# Patient Record
Sex: FEMALE | Race: BLACK OR AFRICAN AMERICAN | Employment: UNEMPLOYED | URBAN - METROPOLITAN AREA
[De-identification: names, ages, dates, MRNs, and addresses within clinical notes are randomized per-mention and may not be internally consistent; named-entity substitution may affect disease eponyms.]

---

## 2017-06-19 ENCOUNTER — HOSPITAL ENCOUNTER (OUTPATIENT)
Dept: MAMMOGRAPHY | Age: 52
Discharge: HOME | End: 2017-06-19
Attending: FAMILY MEDICINE | Admitting: FAMILY MEDICINE

## 2017-06-19 DIAGNOSIS — Z12.31 ENCOUNTER FOR SCREENING MAMMOGRAM FOR BREAST CANCER: ICD-10-CM

## 2018-08-24 ENCOUNTER — HOSPITAL ENCOUNTER (OUTPATIENT)
Dept: MAMMOGRAPHY | Age: 53
Discharge: HOME | End: 2018-08-24
Attending: FAMILY MEDICINE | Admitting: FAMILY MEDICINE

## 2018-08-24 DIAGNOSIS — Z12.31 VISIT FOR SCREENING MAMMOGRAM: ICD-10-CM

## 2019-03-23 ENCOUNTER — HOSPITAL ENCOUNTER (EMERGENCY)
Age: 54
Discharge: HOME | End: 2019-03-23

## 2019-03-23 VITALS
HEART RATE: 99 BPM | OXYGEN SATURATION: 100 % | HEIGHT: 63 IN | BODY MASS INDEX: 23.92 KG/M2 | DIASTOLIC BLOOD PRESSURE: 78 MMHG | RESPIRATION RATE: 16 BRPM | WEIGHT: 135 LBS | SYSTOLIC BLOOD PRESSURE: 123 MMHG | TEMPERATURE: 97.8 F

## 2019-03-23 DIAGNOSIS — R51.9 FRONTAL HEADACHE: Primary | ICD-10-CM

## 2019-03-23 DIAGNOSIS — J01.10 ACUTE FRONTAL SINUSITIS, RECURRENCE NOT SPECIFIED: ICD-10-CM

## 2019-03-23 PROCEDURE — 99282 EMERGENCY DEPT VISIT SF MDM: CPT

## 2019-03-23 RX ORDER — GUAIFENESIN/DEXTROMETHORPHAN 100-10MG/5
5 SYRUP ORAL 3 TIMES DAILY PRN
Qty: 120 ML | Refills: 0 | Status: SHIPPED | OUTPATIENT
Start: 2019-03-23 | End: 2019-03-23 | Stop reason: SDUPTHER

## 2019-03-23 RX ORDER — FLUTICASONE PROPIONATE 50 MCG
1 SPRAY, SUSPENSION (ML) NASAL DAILY
Qty: 1 BOTTLE | Refills: 0 | Status: SHIPPED | OUTPATIENT
Start: 2019-03-23

## 2019-03-23 RX ORDER — CETIRIZINE HYDROCHLORIDE 10 MG/1
10 TABLET ORAL DAILY
Qty: 30 TABLET | Refills: 0 | Status: SHIPPED | OUTPATIENT
Start: 2019-03-23 | End: 2019-04-22

## 2019-03-23 RX ORDER — GUAIFENESIN/DEXTROMETHORPHAN 100-10MG/5
5 SYRUP ORAL 3 TIMES DAILY PRN
Qty: 120 ML | Refills: 0 | Status: SHIPPED | OUTPATIENT
Start: 2019-03-23 | End: 2019-04-02

## 2019-03-23 ASSESSMENT — ENCOUNTER SYMPTOMS
DIARRHEA: 0
COLOR CHANGE: 0
RHINORRHEA: 1
EYE REDNESS: 0
EYE DISCHARGE: 0
COUGH: 0
VOMITING: 0
SINUS PAIN: 1
SORE THROAT: 0
RESPIRATORY NEGATIVE: 1
SINUS PRESSURE: 1
SHORTNESS OF BREATH: 0
FACIAL SWELLING: 0
NAUSEA: 0
ABDOMINAL PAIN: 0
VOICE CHANGE: 0
CONSTIPATION: 0
TROUBLE SWALLOWING: 0

## 2019-03-23 ASSESSMENT — PAIN DESCRIPTION - LOCATION: LOCATION: FACE

## 2019-03-23 ASSESSMENT — PAIN DESCRIPTION - PAIN TYPE: TYPE: ACUTE PAIN

## 2019-03-23 ASSESSMENT — PAIN SCALES - GENERAL: PAINLEVEL_OUTOF10: 6

## 2019-05-07 ENCOUNTER — HOSPITAL ENCOUNTER (EMERGENCY)
Age: 54
Discharge: HOME | End: 2019-05-07
Payer: COMMERCIAL

## 2019-05-07 VITALS
DIASTOLIC BLOOD PRESSURE: 81 MMHG | HEART RATE: 87 BPM | OXYGEN SATURATION: 100 % | RESPIRATION RATE: 16 BRPM | TEMPERATURE: 98.5 F | SYSTOLIC BLOOD PRESSURE: 123 MMHG

## 2019-05-07 DIAGNOSIS — J01.00 ACUTE MAXILLARY SINUSITIS, RECURRENCE NOT SPECIFIED: Primary | ICD-10-CM

## 2019-05-07 PROCEDURE — 99282 EMERGENCY DEPT VISIT SF MDM: CPT

## 2019-05-07 RX ORDER — LORATADINE 10 MG/1
10 TABLET ORAL DAILY
Qty: 30 TABLET | Refills: 0 | Status: SHIPPED | OUTPATIENT
Start: 2019-05-07 | End: 2019-07-06

## 2019-05-07 RX ORDER — AMOXICILLIN AND CLAVULANATE POTASSIUM 875; 125 MG/1; MG/1
1 TABLET, FILM COATED ORAL 2 TIMES DAILY
Qty: 20 TABLET | Refills: 0 | Status: SHIPPED | OUTPATIENT
Start: 2019-05-07 | End: 2019-05-17

## 2019-05-07 ASSESSMENT — PAIN DESCRIPTION - PAIN TYPE: TYPE: ACUTE PAIN

## 2019-05-07 ASSESSMENT — ENCOUNTER SYMPTOMS
CONSTIPATION: 0
NAUSEA: 0
RHINORRHEA: 0
BLOOD IN STOOL: 0
SINUS PRESSURE: 1
VOMITING: 0
ABDOMINAL PAIN: 0
SHORTNESS OF BREATH: 0
SINUS PAIN: 1
SORE THROAT: 0
DIARRHEA: 0

## 2019-05-07 ASSESSMENT — PAIN DESCRIPTION - LOCATION: LOCATION: FACE

## 2019-05-07 ASSESSMENT — PAIN SCALES - GENERAL: PAINLEVEL_OUTOF10: 8

## 2019-05-07 NOTE — ED PROVIDER NOTES
[address] [facility]        Pt Name: Olman Soto  MRN: [id number]  [place] 1965  Date of evaluation: 5/7/2019  Provider: JANINA Cronin - CNP  PCP: Randi Tavarez MD      [address] [place] [address]       Chief Complaint   Patient presents with    Facial Pain     started 2 days ago, tried to tell PCP, and not getting better been taking OTC       HISTORY OF PRESENT ILLNESS   (Location/Symptom, Timing/Onset,Context/Setting, Quality, Duration, Modifying Factors, Severity)  Note limiting factors. Olman Soto is a 49 y.o. female who presents to ER with concern for sinus tenderness and postnasal drip. Present ×3 days. No improvement with Mucinex. She does report history of seasonal allergies but has not started seasonal allergy medication at this time. She denies fever, rash, headaches, dizziness, chest pain, shortness of breath, cough, congestion, abdominal pain, nausea, vomiting, diarrhea, constipation, blood in the stool, or painful urination. No family at bedside. Nursing Notes triage note reviewed and agreed with or any disagreements were addressed  in the HPI. REVIEW OF SYSTEMS    (2-9 systems for level 4, 10 or more for level 5)     Review of Systems   Constitutional: Negative for chills and fever. HENT: Positive for postnasal drip, sinus pressure and sinus pain. Negative for rhinorrhea and sore throat. Eyes: Negative for visual disturbance. Respiratory: Negative for shortness of breath. Cardiovascular: Negative for chest pain. Gastrointestinal: Negative for abdominal pain, blood in stool, constipation, diarrhea, nausea and vomiting. Genitourinary: Negative for dysuria, flank pain and hematuria. Skin: Negative for rash. Neurological: Negative for weakness and headaches. All other systems reviewed and are negative. Positives and Pertinent negatives as per HPI.   Except as noted above in the ROS, week: None     Minutes per session: None    Stress: None   Relationships    Social connections:     Talks on phone: None     Gets together: None     Attends Zoroastrian service: None     Active member of club or organization: None     Attends meetings of clubs or organizations: None     Relationship status: None    Intimate partner violence:     Fear of current or ex partner: None     Emotionally abused: None     Physically abused: None     Forced sexual activity: None   Other Topics Concern    None   Social History Narrative    None       SCREENINGS             PHYSICAL EXAM  (up to 7 for level 4, 8 or more for level 5)     ED Triage Vitals [05/07/19 1017]   BP Temp Temp src Pulse Resp SpO2 Height Weight   123/81 98.5 °F (36.9 °C) -- 87 16 100 % -- --       Physical Exam   Constitutional: She is oriented to person, place, and time. She appears well-developed and well-nourished. No distress. HENT:   Head: Normocephalic and atraumatic. Nose: Right sinus exhibits maxillary sinus tenderness. Right sinus exhibits no frontal sinus tenderness. Left sinus exhibits maxillary sinus tenderness. Left sinus exhibits no frontal sinus tenderness. Eyes: Right eye exhibits no discharge. Left eye exhibits no discharge. Neck: Normal range of motion. Pulmonary/Chest: Effort normal. No stridor. No respiratory distress. Musculoskeletal: Normal range of motion. Neurological: She is alert and oriented to person, place, and time. Skin: Skin is warm and dry. She is not diaphoretic. No pallor. Psychiatric: She has a normal mood and affect. Her behavior is normal.   Nursing note and vitals reviewed. DIAGNOSTIC RESULTS   LABS:    Labs Reviewed - No data to display    All other labs werewithin normal range or not returned as of this dictation. EKG:  All EKG's are interpreted by the Emergency Department Physician who either signs or Co-signs this chart in the absence of acardiologist.  Please see their note for interpretation of EKG. RADIOLOGY:   Interpretation per the Radiologist below, if available at the time of this note:    No orders to display     No results found. PROCEDURES   Unless otherwise noted below, none     Procedures    CRITICAL CARE TIME     n/a    CONSULTS:  None      EMERGENCY DEPARTMENT COURSE and DIFFERENTIAL DIAGNOSIS/MDM:   Vitals:    Vitals:    05/07/19 1017   BP: 123/81   Pulse: 87   Resp: 16   Temp: 98.5 °F (36.9 °C)   SpO2: 100%       Ethan Mooney was given the following medications:  Medications - No data to display    Ethan Mooney was evaluated in the emergency department with concern for sinus pressure and postnasal drip. Consistent with sinus infection. The presentation is consistent with URI. This is likely the result of viral or allergic causes. There is no evidence of sepsis, meningitis, epiglottitis, pneumonia, streptococcal pharyngitis, otitis media, or other bacterial infection that would be managed with antibiotics. The patient is tolerating PO without difficulty and is in no acute distress on exam.  BS clear to ascultation. Vitals unremarkable. The patient can be managed as an outpatient. Strict return precautions given for changing or worsening pain, trouble swallowing or breathing, neck stiffness, fever, or rash. Ethan Mooney is stable in the ER and safe to follow as an outpatient. The patient is discharged on the following medications. They were counseled on how to take the newly prescribed medications:  Discharge Medication List as of 5/7/2019 11:09 AM      START taking these medications    Details   loratadine (CLARITIN) 10 MG tablet Take 1 tablet by mouth daily, Disp-30 tablet, R-0Print      amoxicillin-clavulanate (AUGMENTIN) 875-125 MG per tablet Take 1 tablet by mouth 2 times daily for 10 days, Disp-20 tablet, R-0Print          .   Instructed to follow-up with:  Kyle Kowalski MD  [facility] [id number]  [phone]    Schedule an appointment as soon as possible for a visit in 3 days  For a recheck    Return to the ER for new or worsening symptoms. This plan was discussed with the patient and all family available in the room at discharge who are all in agreement with the plan. I evaluated the patient. The physician was available for consultation as needed. The patient and / or the family were informed of the results of any tests, a time was given to answer questions, a plan was proposed and they agreed with plan. FINAL IMPRESSION      1.  Acute maxillary sinusitis, recurrence not specified          DISPOSITION/PLAN   DISPOSITION Decision To Discharge 05/07/2019 11:05:26 AM        DISCONTINUED MEDICATIONS:  Discharge Medication List as of 5/7/2019 11:09 AM                   (Please note that portions of this note were completed with a voice recognition program.  Efforts were made to edit the dictations but occasionally words are mis-transcribed.)    JANINA Lezama CNP (electronically signed)        JANINA Lezama CNP  05/07/19 9479

## 2019-06-25 ENCOUNTER — APPOINTMENT (OUTPATIENT)
Dept: CT IMAGING | Age: 54
End: 2019-06-25
Payer: COMMERCIAL

## 2019-06-25 ENCOUNTER — HOSPITAL ENCOUNTER (EMERGENCY)
Age: 54
Discharge: HOME | End: 2019-06-25
Attending: EMERGENCY MEDICINE
Payer: COMMERCIAL

## 2019-06-25 ENCOUNTER — APPOINTMENT (OUTPATIENT)
Dept: GENERAL RADIOLOGY | Age: 54
End: 2019-06-25
Payer: COMMERCIAL

## 2019-06-25 VITALS
SYSTOLIC BLOOD PRESSURE: 131 MMHG | BODY MASS INDEX: 23.92 KG/M2 | RESPIRATION RATE: 19 BRPM | TEMPERATURE: 98.9 F | WEIGHT: 130 LBS | OXYGEN SATURATION: 100 % | HEIGHT: 62 IN | HEART RATE: 87 BPM | DIASTOLIC BLOOD PRESSURE: 77 MMHG

## 2019-06-25 DIAGNOSIS — J34.89 SINUS PRESSURE: Primary | ICD-10-CM

## 2019-06-25 DIAGNOSIS — R42 DIZZINESS: ICD-10-CM

## 2019-06-25 LAB
A/G RATIO: 1.3 (ref 1.1–2.2)
ALBUMIN SERPL-MCNC: 4.3 G/DL (ref 3.4–5)
ALP BLD-CCNC: 83 U/L (ref 40–129)
ALT SERPL-CCNC: 16 U/L (ref 10–40)
ANION GAP SERPL CALCULATED.3IONS-SCNC: 12 MMOL/L (ref 3–16)
AST SERPL-CCNC: 46 U/L (ref 15–37)
BASOPHILS ABSOLUTE: 0 K/UL (ref 0–0.2)
BASOPHILS RELATIVE PERCENT: 0.8 %
BILIRUB SERPL-MCNC: <0.2 MG/DL (ref 0–1)
BUN BLDV-MCNC: 5 MG/DL (ref 7–20)
CALCIUM SERPL-MCNC: 9.5 MG/DL (ref 8.3–10.6)
CHLORIDE BLD-SCNC: 95 MMOL/L (ref 99–110)
CO2: 23 MMOL/L (ref 21–32)
CREAT SERPL-MCNC: 0.6 MG/DL (ref 0.6–1.1)
EOSINOPHILS ABSOLUTE: 0 K/UL (ref 0–0.6)
EOSINOPHILS RELATIVE PERCENT: 0.5 %
GFR AFRICAN AMERICAN: >60
GFR NON-AFRICAN AMERICAN: >60
GLOBULIN: 3.2 G/DL
GLUCOSE BLD-MCNC: 138 MG/DL (ref 70–99)
HCT VFR BLD CALC: 38.4 % (ref 36–48)
HEMOGLOBIN: 12.8 G/DL (ref 12–16)
LYMPHOCYTES ABSOLUTE: 1.6 K/UL (ref 1–5.1)
LYMPHOCYTES RELATIVE PERCENT: 39.1 %
MCH RBC QN AUTO: 32 PG (ref 26–34)
MCHC RBC AUTO-ENTMCNC: 33.2 G/DL (ref 31–36)
MCV RBC AUTO: 96.3 FL (ref 80–100)
MONOCYTES ABSOLUTE: 0.3 K/UL (ref 0–1.3)
MONOCYTES RELATIVE PERCENT: 8.1 %
NEUTROPHILS ABSOLUTE: 2.1 K/UL (ref 1.7–7.7)
NEUTROPHILS RELATIVE PERCENT: 51.5 %
PDW BLD-RTO: 12.6 % (ref 12.4–15.4)
PLATELET # BLD: 334 K/UL (ref 135–450)
PMV BLD AUTO: 6.5 FL (ref 5–10.5)
POTASSIUM SERPL-SCNC: 4.6 MMOL/L (ref 3.5–5.1)
RBC # BLD: 3.99 M/UL (ref 4–5.2)
SODIUM BLD-SCNC: 130 MMOL/L (ref 136–145)
TOTAL PROTEIN: 7.5 G/DL (ref 6.4–8.2)
TROPONIN: <0.01 NG/ML
WBC # BLD: 4.2 K/UL (ref 4–11)

## 2019-06-25 PROCEDURE — 70450 CT HEAD/BRAIN W/O DYE: CPT

## 2019-06-25 PROCEDURE — 96361 HYDRATE IV INFUSION ADD-ON: CPT

## 2019-06-25 PROCEDURE — 99284 EMERGENCY DEPT VISIT MOD MDM: CPT

## 2019-06-25 PROCEDURE — 96360 HYDRATION IV INFUSION INIT: CPT

## 2019-06-25 PROCEDURE — 71046 X-RAY EXAM CHEST 2 VIEWS: CPT

## 2019-06-25 PROCEDURE — 80053 COMPREHEN METABOLIC PANEL: CPT

## 2019-06-25 PROCEDURE — 2580000003 HC RX 258: Performed by: PHYSICIAN ASSISTANT

## 2019-06-25 PROCEDURE — 85025 COMPLETE CBC W/AUTO DIFF WBC: CPT

## 2019-06-25 PROCEDURE — 6370000000 HC RX 637 (ALT 250 FOR IP): Performed by: PHYSICIAN ASSISTANT

## 2019-06-25 PROCEDURE — 93005 ELECTROCARDIOGRAM TRACING: CPT | Performed by: PHYSICIAN ASSISTANT

## 2019-06-25 PROCEDURE — 84484 ASSAY OF TROPONIN QUANT: CPT

## 2019-06-25 RX ORDER — MECLIZINE HCL 12.5 MG/1
25 TABLET ORAL ONCE
Status: COMPLETED | OUTPATIENT
Start: 2019-06-25 | End: 2019-06-25

## 2019-06-25 RX ORDER — 0.9 % SODIUM CHLORIDE 0.9 %
1000 INTRAVENOUS SOLUTION INTRAVENOUS ONCE
Status: COMPLETED | OUTPATIENT
Start: 2019-06-25 | End: 2019-06-25

## 2019-06-25 RX ORDER — PSEUDOEPHEDRINE HCL 120 MG/1
120 TABLET, FILM COATED, EXTENDED RELEASE ORAL EVERY 12 HOURS
Qty: 14 TABLET | Refills: 0 | Status: SHIPPED | OUTPATIENT
Start: 2019-06-25 | End: 2019-07-02

## 2019-06-25 RX ORDER — MECLIZINE HYDROCHLORIDE 25 MG/1
25 TABLET ORAL 3 TIMES DAILY PRN
Qty: 20 TABLET | Refills: 0 | Status: SHIPPED | OUTPATIENT
Start: 2019-06-25 | End: 2019-07-05

## 2019-06-25 RX ADMIN — MECLIZINE 25 MG: 12.5 TABLET ORAL at 18:39

## 2019-06-25 RX ADMIN — SODIUM CHLORIDE 1000 ML: 9 INJECTION, SOLUTION INTRAVENOUS at 18:39

## 2019-06-25 ASSESSMENT — ENCOUNTER SYMPTOMS
TROUBLE SWALLOWING: 0
VOICE CHANGE: 0
RHINORRHEA: 0
WHEEZING: 0
SHORTNESS OF BREATH: 0
SINUS PRESSURE: 1
ABDOMINAL PAIN: 0
COUGH: 0
SORE THROAT: 0
VOMITING: 0
DIARRHEA: 0
NAUSEA: 0

## 2019-06-25 ASSESSMENT — PAIN DESCRIPTION - LOCATION: LOCATION: HEAD

## 2019-06-25 ASSESSMENT — PAIN SCALES - GENERAL: PAINLEVEL_OUTOF10: 7

## 2019-06-25 NOTE — ED PROVIDER NOTES
visual disturbance. Respiratory: Negative for cough, shortness of breath and wheezing. Cardiovascular: Negative for chest pain. Gastrointestinal: Negative for abdominal pain, diarrhea, nausea and vomiting. Genitourinary: Negative for difficulty urinating, dysuria and hematuria. Musculoskeletal: Negative for neck pain and neck stiffness. Skin: Negative for rash. Neurological: Positive for dizziness, light-headedness and headaches. Negative for syncope, facial asymmetry, speech difficulty, weakness and numbness. Positives and Pertinent negatives as per HPI. Except as noted abovein the ROS, all other systems were reviewed and negative. PAST MEDICAL HISTORY     Past Medical History:   Diagnosis Date    Diabetes mellitus ([facility] [address]     Hyperlipidemia     Hypertension          SURGICAL HISTORY     Past Surgical History:   Procedure Laterality Date    CHOLECYSTECTOMY      FINGER SURGERY           CURRENTMEDICATIONS       Previous Medications    ASPIRIN 81 MG TABLET    Take 81 mg by mouth daily    FLUTICASONE (FLONASE) 50 MCG/ACT NASAL SPRAY    1 spray by Each Nare route daily    GLIMEPIRIDE (AMARYL) 2 MG TABLET    Take 2 mg by mouth 2 times daily. LIRAGLUTIDE (VICTOZA) 6 MG/ML SOLN    Inject  into the skin. LISINOPRIL (PRINIVIL;ZESTRIL) 10 MG TABLET    Take 10 mg by mouth daily. LORATADINE (CLARITIN) 10 MG TABLET    Take 1 tablet by mouth daily    MAGNESIUM OXIDE (MAG-) 400 MG TABLET    Take 1 tablet by mouth daily. METFORMIN (GLUCOPHAGE) 500 MG TABLET    Take 500 mg by mouth 2 times daily (with meals). SIMVASTATIN (ZOCOR) 10 MG TABLET    Take 10 mg by mouth nightly. ALLERGIES     Patient has no known allergies. FAMILYHISTORY     History reviewed. No pertinent family history.        SOCIAL HISTORY       Social History     Socioeconomic History    Marital status: Single     Spouse name: None    Number of children: None    Years of education: None    No respiratory distress. She has no wheezes. She has no rales. She exhibits no tenderness. Abdominal: Soft. She exhibits no distension and no mass. There is no tenderness. There is no guarding. Musculoskeletal: Normal range of motion. Lymphadenopathy:     She has no cervical adenopathy. Neurological: She is alert and oriented to person, place, and time. She is not disoriented. No cranial nerve deficit. GCS eye subscore is 4. GCS verbal subscore is 5. GCS motor subscore is 6. Skin: Skin is warm and dry. She is not diaphoretic. Psychiatric: She has a normal mood and affect. Her behavior is normal.   Nursing note and vitals reviewed. DIAGNOSTIC RESULTS   LABS:    Labs Reviewed   CBC WITH AUTO DIFFERENTIAL - Abnormal; Notable for the following components:       Result Value    RBC 3.99 (*)     All other components within normal limits    Narrative:     Performed at:  [facility] [address],  [place], [place]   Phone [phone]   COMPREHENSIVE METABOLIC PANEL - Abnormal; Notable for the following components:    Sodium 130 (*)     Chloride 95 (*)     Glucose 138 (*)     BUN 5 (*)     AST 46 (*)     All other components within normal limits    Narrative:     Performed at:  [facility] [address],  [place]   Phone [phone]   TROPONIN    Narrative:     Performed at:  [facility] [address],  [place] [facility] [place]   Phone [phone]   URINE RT REFLEX TO CULTURE       All other labs were within normal range or not returned as of this dictation. EKG: All EKG's are interpreted by the Emergency Department Physician in the absence of a cardiologist.  Please see their note for interpretation of EKG.       RADIOLOGY:   Non-plain film images such as CT, Ultrasound and MRI are read by the radiologist. Plain radiographic images are visualized andpreliminarily interpreted by the ED Provider with the below findings:        Interpretation [facility] Radiologist below, if available at the time of this note:    CT HEAD WO CONTRAST   Final Result   No acute intracranial abnormality. XR CHEST STANDARD (2 VW)   Final Result   1. No acute abnormality. No results found. PROCEDURES   Unless otherwise noted below, none     Procedures    CRITICAL CARE TIME   N/A    CONSULTS:  None      EMERGENCY DEPARTMENT COURSE and DIFFERENTIAL DIAGNOSIS/MDM:   Vitals:    Vitals:    06/25/19 1703 06/25/19 1800 06/25/19 1830 06/25/19 1900   BP: 126/79 125/76 111/78 114/68   Pulse: 99 92 93 83   Resp: 15 19 19 21   Temp: 98.9 °F (37.2 °C)      TempSrc: Infrared      SpO2:   100% 100%   Weight: 130 lb (59 kg)      Height: 5' 2\" (1.575 m)          Patient was given thefollowing medications:  Medications   meclizine (ANTIVERT) tablet 25 mg (25 mg Oral Given 6/25/19 1839)   0.9 % sodium chloride bolus (1,000 mLs Intravenous New Bag 6/25/19 1839)       Presents for evaluation of headache, dizziness/lightheadedness and sinus pressure x6 weeks. On exam, she is resting comfortably in bed in no acute distress and nontoxic. Vitals are stable and she is afebrile. Lungs are clear to auscultation bilaterally, chest is nontender abdomen is benign. She is alert and oriented x3. GCS 15. Cranial nerves II through XII are intact. Normal gait. Negative Romberg. Neck is nontender full range of motion, no meningeal signs. H ENT exam is unremarkable, TMs clear bilaterally. She was given IV fluids and Antivert for symptomatic relief and will be reevaluated. Please see attending note for EKG interpretation. CBC and CMP are unremarkable. Troponin  is negative. CT of the head shows no acute intracranial abnormality. Chest x-ray shows no acute abnormality. Patient did have some improvement on reevaluation.   She was given prescriptions for Sudafed and Antivert as well as ENT contact information for

## 2019-06-25 NOTE — ED PROVIDER NOTES
This patient was seen by the Mid-Level Provider. I have seen and examined the patient, agree with the workup, evaluation, management and diagnosis. Care plan has been discussed. My assessment reveals a 60-year-old female who presents with a \"sinus infection\". This is a 60-year-old female presents with what she feels is a sinus infection. She states she has been seen several times for it and is not getting better. She is coughing up yellowish-green sputum. She states nothing helps her mucous membranes. She has been seen in our emergency department for this last month. The patient's work-up today was extensive and unremarkable including laboratory results. A CT of the head was obtained and was negative. Chest x-ray was obtained was negative. I see no evidence of emergent problem. However given the length of her symptoms I am referring the patient to ear nose and throat. She will be treated empirically and I told the patient to start taking Sudafed as well. She is to return if worse. Exam: Well-nourished female no acute distress. Her chest was clear to auscultation bilaterally. There is no cervical pain to palpation of her frontal or paranasal sinuses. MDM: She was discharged with the appropriate instructions. She was referred to ear nose and throat.     Results for orders placed or performed during the hospital encounter of 06/25/19   CBC Auto Differential   Result Value Ref Range    WBC 4.2 4.0 - 11.0 K/uL    RBC 3.99 (L) 4.00 - 5.20 M/uL    Hemoglobin 12.8 12.0 - 16.0 g/dL    Hematocrit 38.4 36.0 - 48.0 %    MCV 96.3 80.0 - 100.0 fL    MCH 32.0 26.0 - 34.0 pg    MCHC 33.2 31.0 - 36.0 g/dL    RDW 12.6 12.4 - 15.4 %    Platelets 540 903 - 834 K/uL    MPV 6.5 5.0 - 10.5 fL    Neutrophils % 51.5 %    Lymphocytes % 39.1 %    Monocytes % 8.1 %    Eosinophils % 0.5 %    Basophils % 0.8 %    Neutrophils # 2.1 1.7 - 7.7 K/uL    Lymphocytes # 1.6 1.0 - 5.1 K/uL    Monocytes # 0.3 0.0 - 1.3 K/uL Eosinophils # 0.0 0.0 - 0.6 K/uL    Basophils # 0.0 0.0 - 0.2 K/uL   Comprehensive Metabolic Panel   Result Value Ref Range    Sodium 130 (L) 136 - 145 mmol/L    Potassium 4.6 3.5 - 5.1 mmol/L    Chloride 95 (L) 99 - 110 mmol/L    CO2 23 21 - 32 mmol/L    Anion Gap 12 3 - 16    Glucose 138 (H) 70 - 99 mg/dL    BUN 5 (L) 7 - 20 mg/dL    CREATININE 0.6 0.6 - 1.1 mg/dL    GFR Non-African American >60 >60    GFR African American >60 >60    Calcium 9.5 8.3 - 10.6 mg/dL    Total Protein 7.5 6.4 - 8.2 g/dL    Alb 4.3 3.4 - 5.0 g/dL    Albumin/Globulin Ratio 1.3 1.1 - 2.2    Total Bilirubin <0.2 0.0 - 1.0 mg/dL    Alkaline Phosphatase 83 40 - 129 U/L    ALT 16 10 - 40 U/L    AST 46 (H) 15 - 37 U/L    Globulin 3.2 g/dL   Troponin   Result Value Ref Range    Troponin <0.01 <0.01 ng/mL   EKG 12 Lead   Result Value Ref Range    Ventricular Rate 92 BPM    Atrial Rate 92 BPM    P-R Interval 156 ms    QRS Duration 68 ms    Q-T Interval 356 ms    QTc Calculation (Bazett) 440 ms    P Axis 68 degrees    R Axis 24 degrees    T Axis 56 degrees    Diagnosis Normal sinus rhythmNormal ECG      Xr Chest Standard (2 Vw)    Result Date: 6/25/2019  EXAMINATION: TWO XRAY VIEWS OF THE CHEST 6/25/2019 5:41 pm COMPARISON: 04/09/2018 HISTORY: ORDERING SYSTEM PROVIDED HISTORY: cough, dizziness TECHNOLOGIST PROVIDED HISTORY: Reason for exam:->cough, dizziness Ordering Physician Provided Reason for Exam: URI (states being treated for a sinus infection but is not getting better. States coughing up yellow green sputum. ) Acuity: Unknown Type of Exam: Unknown FINDINGS: The lungs are clear. The cardiac silhouette is within normal limits. There is no pneumothorax or pleural effusion. Status post cholecystectomy. 1.  No acute abnormality.      Ct Head Wo Contrast    Result Date: 6/25/2019  EXAMINATION: CT OF THE HEAD WITHOUT CONTRAST  6/25/2019 5:50 pm TECHNIQUE: CT of the head was performed without the administration of intravenous contrast. Dose modulation, iterative reconstruction, and/or weight based adjustment of the mA/kV was utilized to reduce the radiation dose to as low as reasonably achievable. COMPARISON: None. HISTORY: ORDERING SYSTEM PROVIDED HISTORY: dizzy TECHNOLOGIST PROVIDED HISTORY: Has a \"code stroke\" or \"stroke alert\" been called? ->No Ordering Physician Provided Reason for Exam: dizzy Acuity: Acute Type of Exam: Initial FINDINGS: BRAIN/VENTRICLES: There is no acute intracranial hemorrhage, mass effect or midline shift. No abnormal extra-axial fluid collection. The gray-white differentiation is maintained without evidence of an acute infarct. There is no evidence of hydrocephalus. ORBITS: The visualized portion of the orbits demonstrate no acute abnormality. SINUSES: The visualized paranasal sinuses and mastoid air cells demonstrate no acute abnormality. SOFT TISSUES/SKULL:  No acute abnormality of the visualized skull or soft tissues. No acute intracranial abnormality.             Ena Gomez MD  06/25/19 9337

## 2019-06-26 LAB
EKG ATRIAL RATE: 92 BPM
EKG DIAGNOSIS: NORMAL
EKG P AXIS: 68 DEGREES
EKG P-R INTERVAL: 156 MS
EKG Q-T INTERVAL: 356 MS
EKG QRS DURATION: 68 MS
EKG QTC CALCULATION (BAZETT): 440 MS
EKG R AXIS: 24 DEGREES
EKG T AXIS: 56 DEGREES
EKG VENTRICULAR RATE: 92 BPM

## 2019-06-26 PROCEDURE — 93010 ELECTROCARDIOGRAM REPORT: CPT | Performed by: INTERNAL MEDICINE

## 2019-07-06 ENCOUNTER — HOSPITAL ENCOUNTER (EMERGENCY)
Age: 54
Discharge: HOME | End: 2019-07-06
Attending: EMERGENCY MEDICINE
Payer: COMMERCIAL

## 2019-07-06 VITALS
BODY MASS INDEX: 23.04 KG/M2 | TEMPERATURE: 97.7 F | HEART RATE: 97 BPM | OXYGEN SATURATION: 100 % | DIASTOLIC BLOOD PRESSURE: 74 MMHG | RESPIRATION RATE: 16 BRPM | SYSTOLIC BLOOD PRESSURE: 125 MMHG | WEIGHT: 130 LBS | HEIGHT: 63 IN

## 2019-07-06 DIAGNOSIS — K21.00 GERD WITH ESOPHAGITIS: Primary | ICD-10-CM

## 2019-07-06 LAB
EKG ATRIAL RATE: 100 BPM
EKG DIAGNOSIS: NORMAL
EKG P AXIS: 73 DEGREES
EKG P-R INTERVAL: 164 MS
EKG Q-T INTERVAL: 338 MS
EKG QRS DURATION: 62 MS
EKG QTC CALCULATION (BAZETT): 436 MS
EKG R AXIS: 48 DEGREES
EKG T AXIS: 59 DEGREES
EKG VENTRICULAR RATE: 100 BPM

## 2019-07-06 PROCEDURE — 99283 EMERGENCY DEPT VISIT LOW MDM: CPT

## 2019-07-06 PROCEDURE — 6370000000 HC RX 637 (ALT 250 FOR IP): Performed by: EMERGENCY MEDICINE

## 2019-07-06 PROCEDURE — 93005 ELECTROCARDIOGRAM TRACING: CPT | Performed by: EMERGENCY MEDICINE

## 2019-07-06 PROCEDURE — 93010 ELECTROCARDIOGRAM REPORT: CPT | Performed by: INTERNAL MEDICINE

## 2019-07-06 RX ORDER — PANTOPRAZOLE SODIUM 20 MG/1
20 TABLET, DELAYED RELEASE ORAL
Status: ON HOLD | COMMUNITY
Start: 2019-06-10 | End: 2021-06-07 | Stop reason: HOSPADM

## 2019-07-06 RX ORDER — SUCRALFATE 1 G/1
1 TABLET ORAL EVERY 6 HOURS PRN
Qty: 40 TABLET | Refills: 0 | Status: SHIPPED | OUTPATIENT
Start: 2019-07-06 | End: 2019-09-24 | Stop reason: SDUPTHER

## 2019-07-06 RX ADMIN — LIDOCAINE HYDROCHLORIDE: 20 SOLUTION ORAL; TOPICAL at 10:12

## 2019-07-06 ASSESSMENT — PAIN DESCRIPTION - PROGRESSION: CLINICAL_PROGRESSION: RESOLVED

## 2019-08-05 ENCOUNTER — OFFICE VISIT (OUTPATIENT)
Dept: ENT CLINIC | Age: 54
End: 2019-08-05
Payer: COMMERCIAL

## 2019-08-05 VITALS
HEART RATE: 92 BPM | BODY MASS INDEX: 21.84 KG/M2 | HEIGHT: 62 IN | DIASTOLIC BLOOD PRESSURE: 83 MMHG | SYSTOLIC BLOOD PRESSURE: 130 MMHG | WEIGHT: 118.7 LBS

## 2019-08-05 DIAGNOSIS — R07.0 BURNING SENSATION OF THROAT: ICD-10-CM

## 2019-08-05 DIAGNOSIS — R42 DISEQUILIBRIUM: Primary | ICD-10-CM

## 2019-08-05 PROBLEM — E78.2 MIXED HYPERLIPIDEMIA: Status: ACTIVE | Noted: 2017-08-10

## 2019-08-05 PROBLEM — K80.00 CALCULUS OF GALLBLADDER WITH ACUTE CHOLECYSTITIS: Status: ACTIVE | Noted: 2019-08-05

## 2019-08-05 PROBLEM — I10 ESSENTIAL HYPERTENSION: Status: ACTIVE | Noted: 2017-08-10

## 2019-08-05 PROBLEM — R06.02 SHORTNESS OF BREATH: Status: ACTIVE | Noted: 2017-08-10

## 2019-08-05 PROBLEM — R94.31 ABNORMAL EKG: Status: ACTIVE | Noted: 2017-08-10

## 2019-08-05 PROBLEM — E11.9 DIABETES MELLITUS (HCC): Status: ACTIVE | Noted: 2019-08-05

## 2019-08-05 PROBLEM — Z12.11 COLON CANCER SCREENING: Status: ACTIVE | Noted: 2019-08-05

## 2019-08-05 PROCEDURE — 99204 OFFICE O/P NEW MOD 45 MIN: CPT | Performed by: OTOLARYNGOLOGY

## 2019-08-05 RX ORDER — CYANOCOBALAMIN (VITAMIN B-12) 500 MCG
LOZENGE ORAL
COMMUNITY

## 2019-08-05 RX ORDER — DESLORATADINE 5 MG/1
TABLET, ORALLY DISINTEGRATING ORAL
Status: ON HOLD | COMMUNITY
Start: 2019-05-06 | End: 2021-06-07 | Stop reason: HOSPADM

## 2019-08-05 NOTE — PROGRESS NOTES
Denied dyspnea. Denied chronic cough. GASTROINTESTINAL:  Denied dysphagia. Denied hematemesis. MUSCULOSKELETAL:  Denied pain in joints, arthritis. Denied pain in bones. INTEGUMENTARY (SKIN):  Denied hives. Denied non-healing skin ulcers/lesions. NEUROLOGIC:  Denied paralysis of any body parts. Denied chronic or frequently recurrent headache. HEMATOLOGIC/LYMPHATIC:  Denied prolonged and excessive bleeding. Denied enlarged lymph nodes. ALLERGIC/IMMUNOLOGIC:  Denied seasonal or environmental allergies. Denied frequent infections. ENDOCRINE:  Denied diabetes. Denied thyroid disorders. PAST MEDICAL, FAMILY, AND SOCIAL HISTORY:       Past Medical History:   Diagnosis Date    Diabetes mellitus ([facility] [address]     Hyperlipidemia     Hypertension          Past Surgical History:   Procedure Laterality Date    CHOLECYSTECTOMY      FINGER SURGERY           History reviewed. No pertinent family history.       Social History     Socioeconomic History    Marital status: Single     Spouse name: Not on file    Number of children: Not on file    Years of education: Not on file    Highest education level: Not on file   Occupational History    Not on file   Social Needs    Financial resource strain: Not on file    Food insecurity:     Worry: Not on file     Inability: Not on file    Transportation needs:     Medical: Not on file     Non-medical: Not on file   Tobacco Use    Smoking status: Never Smoker    Smokeless tobacco: Never Used   Substance and Sexual Activity    Alcohol use: No    Drug use: No    Sexual activity: Not on file   Lifestyle    Physical activity:     Days per week: Not on file     Minutes per session: Not on file    Stress: Not on file   Relationships    Social connections:     Talks on phone: Not on file     Gets together: Not on file     Attends Scientologist service: Not on file     Active member of club or organization: Not on file     Attends meetings of clubs or

## 2019-08-13 ENCOUNTER — PROCEDURE VISIT (OUTPATIENT)
Dept: AUDIOLOGY | Age: 54
End: 2019-08-13
Payer: COMMERCIAL

## 2019-08-13 DIAGNOSIS — H90.3 SENSORY HEARING LOSS, BILATERAL: ICD-10-CM

## 2019-08-13 DIAGNOSIS — R42 DIZZINESS AND GIDDINESS: Primary | ICD-10-CM

## 2019-08-13 PROCEDURE — 92537 CALORIC VSTBLR TEST W/REC: CPT | Performed by: AUDIOLOGIST

## 2019-08-13 PROCEDURE — 92547 SUPPLEMENTAL ELECTRICAL TEST: CPT | Performed by: AUDIOLOGIST

## 2019-08-13 PROCEDURE — 92557 COMPREHENSIVE HEARING TEST: CPT | Performed by: AUDIOLOGIST

## 2019-08-13 PROCEDURE — 92540 BASIC VESTIBULAR EVALUATION: CPT | Performed by: AUDIOLOGIST

## 2019-08-13 PROCEDURE — 92550 TYMPANOMETRY & REFLEX THRESH: CPT | Performed by: AUDIOLOGIST

## 2019-08-28 ENCOUNTER — OFFICE VISIT (OUTPATIENT)
Dept: ENT CLINIC | Age: 54
End: 2019-08-28
Payer: COMMERCIAL

## 2019-08-28 VITALS
DIASTOLIC BLOOD PRESSURE: 85 MMHG | HEART RATE: 86 BPM | BODY MASS INDEX: 22.48 KG/M2 | WEIGHT: 122.9 LBS | SYSTOLIC BLOOD PRESSURE: 135 MMHG

## 2019-08-28 DIAGNOSIS — K14.6 BURNING TONGUE: ICD-10-CM

## 2019-08-28 DIAGNOSIS — R42 DISEQUILIBRIUM: Primary | ICD-10-CM

## 2019-08-28 DIAGNOSIS — H90.3 BILATERAL HIGH FREQUENCY SENSORINEURAL HEARING LOSS: ICD-10-CM

## 2019-08-28 PROCEDURE — 99214 OFFICE O/P EST MOD 30 MIN: CPT | Performed by: OTOLARYNGOLOGY

## 2019-09-04 PROBLEM — Z12.11 COLON CANCER SCREENING: Status: RESOLVED | Noted: 2019-08-05 | Resolved: 2019-09-04

## 2019-09-24 ENCOUNTER — APPOINTMENT (OUTPATIENT)
Dept: GENERAL RADIOLOGY | Age: 54
End: 2019-09-24
Payer: COMMERCIAL

## 2019-09-24 ENCOUNTER — HOSPITAL ENCOUNTER (EMERGENCY)
Age: 54
Discharge: HOME | End: 2019-09-24
Attending: EMERGENCY MEDICINE
Payer: COMMERCIAL

## 2019-09-24 VITALS
SYSTOLIC BLOOD PRESSURE: 135 MMHG | DIASTOLIC BLOOD PRESSURE: 90 MMHG | BODY MASS INDEX: 23 KG/M2 | TEMPERATURE: 98.1 F | WEIGHT: 125 LBS | HEART RATE: 76 BPM | RESPIRATION RATE: 13 BRPM | HEIGHT: 62 IN | OXYGEN SATURATION: 100 %

## 2019-09-24 DIAGNOSIS — R07.9 NONSPECIFIC CHEST PAIN: ICD-10-CM

## 2019-09-24 DIAGNOSIS — K21.9 GASTROESOPHAGEAL REFLUX DISEASE, ESOPHAGITIS PRESENCE NOT SPECIFIED: Primary | ICD-10-CM

## 2019-09-24 LAB
A/G RATIO: 1.5 (ref 1.1–2.2)
ALBUMIN SERPL-MCNC: 4.7 G/DL (ref 3.4–5)
ALP BLD-CCNC: 85 U/L (ref 40–129)
ALT SERPL-CCNC: 27 U/L (ref 10–40)
ANION GAP SERPL CALCULATED.3IONS-SCNC: 12 MMOL/L (ref 3–16)
AST SERPL-CCNC: 62 U/L (ref 15–37)
BASOPHILS ABSOLUTE: 0 K/UL (ref 0–0.2)
BASOPHILS RELATIVE PERCENT: 0.9 %
BILIRUB SERPL-MCNC: 0.3 MG/DL (ref 0–1)
BUN BLDV-MCNC: 7 MG/DL (ref 7–20)
CALCIUM SERPL-MCNC: 10 MG/DL (ref 8.3–10.6)
CHLORIDE BLD-SCNC: 102 MMOL/L (ref 99–110)
CO2: 27 MMOL/L (ref 21–32)
CREAT SERPL-MCNC: 0.7 MG/DL (ref 0.6–1.1)
EKG ATRIAL RATE: 70 BPM
EKG ATRIAL RATE: 96 BPM
EKG DIAGNOSIS: NORMAL
EKG DIAGNOSIS: NORMAL
EKG P AXIS: 74 DEGREES
EKG P AXIS: 78 DEGREES
EKG P-R INTERVAL: 156 MS
EKG Q-T INTERVAL: 392 MS
EKG Q-T INTERVAL: 392 MS
EKG QRS DURATION: 68 MS
EKG QRS DURATION: 78 MS
EKG QTC CALCULATION (BAZETT): 423 MS
EKG QTC CALCULATION (BAZETT): 487 MS
EKG R AXIS: 27 DEGREES
EKG R AXIS: 36 DEGREES
EKG T AXIS: 55 DEGREES
EKG T AXIS: 57 DEGREES
EKG VENTRICULAR RATE: 70 BPM
EKG VENTRICULAR RATE: 93 BPM
EOSINOPHILS ABSOLUTE: 0 K/UL (ref 0–0.6)
EOSINOPHILS RELATIVE PERCENT: 0.5 %
GFR AFRICAN AMERICAN: >60
GFR NON-AFRICAN AMERICAN: >60
GLOBULIN: 3.2 G/DL
GLUCOSE BLD-MCNC: 240 MG/DL (ref 70–99)
HCT VFR BLD CALC: 40.1 % (ref 36–48)
HEMOGLOBIN: 13.4 G/DL (ref 12–16)
LIPASE: 39 U/L (ref 13–60)
LYMPHOCYTES ABSOLUTE: 0.9 K/UL (ref 1–5.1)
LYMPHOCYTES RELATIVE PERCENT: 27.9 %
MCH RBC QN AUTO: 31.9 PG (ref 26–34)
MCHC RBC AUTO-ENTMCNC: 33.4 G/DL (ref 31–36)
MCV RBC AUTO: 95.5 FL (ref 80–100)
MONOCYTES ABSOLUTE: 0.2 K/UL (ref 0–1.3)
MONOCYTES RELATIVE PERCENT: 6.5 %
NEUTROPHILS ABSOLUTE: 2.2 K/UL (ref 1.7–7.7)
NEUTROPHILS RELATIVE PERCENT: 64.2 %
PDW BLD-RTO: 12.6 % (ref 12.4–15.4)
PLATELET # BLD: 262 K/UL (ref 135–450)
PMV BLD AUTO: 7.4 FL (ref 5–10.5)
POTASSIUM SERPL-SCNC: 3.9 MMOL/L (ref 3.5–5.1)
PRO-BNP: 35 PG/ML (ref 0–124)
RBC # BLD: 4.2 M/UL (ref 4–5.2)
SODIUM BLD-SCNC: 141 MMOL/L (ref 136–145)
TOTAL PROTEIN: 7.9 G/DL (ref 6.4–8.2)
TROPONIN: <0.01 NG/ML
TROPONIN: <0.01 NG/ML
WBC # BLD: 3.4 K/UL (ref 4–11)

## 2019-09-24 PROCEDURE — 83690 ASSAY OF LIPASE: CPT

## 2019-09-24 PROCEDURE — 2500000003 HC RX 250 WO HCPCS: Performed by: NURSE PRACTITIONER

## 2019-09-24 PROCEDURE — 93005 ELECTROCARDIOGRAM TRACING: CPT | Performed by: EMERGENCY MEDICINE

## 2019-09-24 PROCEDURE — 93010 ELECTROCARDIOGRAM REPORT: CPT | Performed by: INTERNAL MEDICINE

## 2019-09-24 PROCEDURE — 80053 COMPREHEN METABOLIC PANEL: CPT

## 2019-09-24 PROCEDURE — 85025 COMPLETE CBC W/AUTO DIFF WBC: CPT

## 2019-09-24 PROCEDURE — 6370000000 HC RX 637 (ALT 250 FOR IP): Performed by: NURSE PRACTITIONER

## 2019-09-24 PROCEDURE — 6370000000 HC RX 637 (ALT 250 FOR IP): Performed by: EMERGENCY MEDICINE

## 2019-09-24 PROCEDURE — 83880 ASSAY OF NATRIURETIC PEPTIDE: CPT

## 2019-09-24 PROCEDURE — 71046 X-RAY EXAM CHEST 2 VIEWS: CPT

## 2019-09-24 PROCEDURE — 93005 ELECTROCARDIOGRAM TRACING: CPT | Performed by: NURSE PRACTITIONER

## 2019-09-24 PROCEDURE — 99285 EMERGENCY DEPT VISIT HI MDM: CPT

## 2019-09-24 PROCEDURE — 96374 THER/PROPH/DIAG INJ IV PUSH: CPT

## 2019-09-24 PROCEDURE — 96361 HYDRATE IV INFUSION ADD-ON: CPT

## 2019-09-24 PROCEDURE — 2580000003 HC RX 258: Performed by: NURSE PRACTITIONER

## 2019-09-24 PROCEDURE — 84484 ASSAY OF TROPONIN QUANT: CPT

## 2019-09-24 RX ORDER — ASPIRIN 81 MG/1
243 TABLET, CHEWABLE ORAL ONCE
Status: COMPLETED | OUTPATIENT
Start: 2019-09-24 | End: 2019-09-24

## 2019-09-24 RX ORDER — 0.9 % SODIUM CHLORIDE 0.9 %
1000 INTRAVENOUS SOLUTION INTRAVENOUS ONCE
Status: COMPLETED | OUTPATIENT
Start: 2019-09-24 | End: 2019-09-24

## 2019-09-24 RX ORDER — SUCRALFATE 1 G/1
1 TABLET ORAL EVERY 6 HOURS PRN
Qty: 40 TABLET | Refills: 0 | Status: ON HOLD | OUTPATIENT
Start: 2019-09-24 | End: 2021-06-07 | Stop reason: HOSPADM

## 2019-09-24 RX ADMIN — ASPIRIN 81 MG 243 MG: 81 TABLET ORAL at 13:45

## 2019-09-24 RX ADMIN — FAMOTIDINE 20 MG: 10 INJECTION, SOLUTION INTRAVENOUS at 11:28

## 2019-09-24 RX ADMIN — SODIUM CHLORIDE 1000 ML: 9 INJECTION, SOLUTION INTRAVENOUS at 11:29

## 2019-09-24 RX ADMIN — LIDOCAINE HYDROCHLORIDE: 20 SOLUTION ORAL; TOPICAL at 11:19

## 2019-09-24 ASSESSMENT — HEART SCORE: ECG: 0

## 2019-09-24 ASSESSMENT — ENCOUNTER SYMPTOMS
CHEST TIGHTNESS: 0
SHORTNESS OF BREATH: 0
VOMITING: 0
DIARRHEA: 0
ABDOMINAL PAIN: 0
NAUSEA: 0

## 2019-09-24 NOTE — ED PROVIDER NOTES
[address]        Pt Name: Morelia Perez  MRN: [id number]  [place] 1965  Date of evaluation: 9/24/2019  Provider: JANINA Marroquin CNP  PCP: Gillian Garcia MD    This patient was seen and evaluated by the attending physician huber, [address]       Chief Complaint   Patient presents with    Gastroesophageal Reflux     pt states she has been hang burning in throat for 1.5 wks and intermittent dizziness. Pt states she has used several meds for reflux but not getting better       HISTORY OF PRESENT ILLNESS   (Location/Symptom, Timing/Onset, Context/Setting, Quality, Duration, Modifying Factors, Severity)  Note limiting factors. Morelia Perez is a 49 y.o. female presents to the emergency department with gastroesophageal reflux. Patient reports that she is experiencing a burning into her throat that radiates into her chest and upper abdomen. She denies any mitigating or exacerbating factors. States she has had symptoms for 1-1/2 weeks despite over-the-counter medications prescribed by her primary care physician. No difference in pain or symptoms with bland diet. She has not had an EGD in many years and does not follow with GI. She denies any chest pain, no history of coronary artery disease. Denies any headache, fever, lightheadedness, dizziness, visual disturbances. No neck or back pain. No shortness of breath, cough, or congestion. No vomiting, diarrhea, constipation, or dysuria. No rash. Nursing Notes were all reviewed and agreed with or any disagreements were addressed  in the HPI. REVIEW OF SYSTEMS    (2-9 systems for level 4, 10 or more for level 5)     Review of Systems   Constitutional: Negative for activity change, chills and fever. HENT:        Throat burning that radiates into chest and upper abdomen   Respiratory: Negative for chest tightness and shortness of breath. Cardiovascular: Negative for chest pain. Gastrointestinal: Negative for abdominal pain, diarrhea, nausea and vomiting. Genitourinary: Negative for dysuria. All other systems reviewed and are negative. Positives and Pertinent negatives as per HPI. Except as noted abovein the ROS, all other systems were reviewed and negative. PAST MEDICAL HISTORY     Past Medical History:   Diagnosis Date    Diabetes mellitus ([facility] [address]     Hyperlipidemia     Hypertension          SURGICAL HISTORY     Past Surgical History:   Procedure Laterality Date    CHOLECYSTECTOMY      FINGER SURGERY           CURRENTMEDICATIONS       Discharge Medication List as of 9/24/2019  3:12 PM      CONTINUE these medications which have NOT CHANGED    Details   MULTIPLE VITAMINS-MINERALS ER PO Take by mouthHistorical Med      desloratadine (CLARINEX REDITAB) 5 MG disintegrating tablet One pill daily. Historical Med      blood glucose test strips (ASCENSIA AUTODISC VI;ONE TOUCH ULTRA TEST VI) strip Historical Med      pantoprazole (PROTONIX) 20 MG tablet Take 20 mg by mouthHistorical Med      aspirin 81 MG tablet Take 81 mg by mouth dailyHistorical Med      metformin (GLUCOPHAGE) 500 MG tablet Take 500 mg by mouth 2 times daily (with meals). Historical Med      lisinopril (PRINIVIL;ZESTRIL) 10 MG tablet Take 10 mg by mouth daily. Historical Med      Vitamin E 400 units TABS Take by mouthHistorical Med      fluticasone (FLONASE) 50 MCG/ACT nasal spray 1 spray by Each Nare route daily, Disp-1 Bottle, R-0Print      glimepiride (AMARYL) 2 MG tablet Take 2 mg by mouth 2 times daily. Historical Med      magnesium oxide (MAG-) 400 MG tablet Take 1 tablet by mouth daily. , Disp-30 tablet, R-1Print               ALLERGIES     Patient has no known allergies. FAMILYHISTORY     History reviewed. No pertinent family history.        SOCIAL HISTORY       Social History     Socioeconomic History    Marital status: Single     Spouse name: None etiology. I see nothing to suggest acute coronary syndrome, myocardial infarction, pulmonary embolism, thoracic aortic dissection, significant pericarditis, pneumonia, pneumothorax, or acute abdomen. I feel the patient can be safely discharged to home with outpatient follow up. Instructions have been given for the patient to return to the Emergency Department for any worsening of the symptoms, including but not limited to increased pain, shortness of breath, abdominal pain or weakness. FINAL IMPRESSION      1. Gastroesophageal reflux disease, esophagitis presence not specified    2.  Nonspecific chest pain          DISPOSITION/PLAN   DISPOSITION Decision To Discharge 09/24/2019 03:04:01 PM      PATIENT REFERREDTO:  Josi Millard MD  [address] [place], [place] [address]in [place]  [phone]    In 2 days      Ciara Santa, [address] [facility] [address] [id number] [phone]    In 3 days  For your acid reflux      DISCHARGE MEDICATIONS:  Discharge Medication List as of 9/24/2019  3:12 PM          DISCONTINUED MEDICATIONS:  Discharge Medication List as of 9/24/2019  3:12 PM                 (Please note that portions ofthis note were completed with a voice recognition program.  Efforts were made to edit the dictations but occasionally words are mis-transcribed.)    JANINA Webber CNP (electronically signed)           JANINA Webber CNP  09/24/19 [id number]

## 2019-09-25 ASSESSMENT — HEART SCORE: ECG: 0

## 2019-09-25 NOTE — ED PROVIDER NOTES
without any morphology abnormalities. A repeat troponin was normal.  Patient feels comfortable returning to home. Patient's pain well-controlled and felt safe for discharge to self-care with close outpatient follow up. Patient is agreeable with plan to discharge and all questions were answered. Strict return precautions including worsening/changing chest pain, vomiting, shortness of breath, fevers, palpitations or syncope were discussed. Patient Referrals:  Gillian Garcia MD  [address]  [phone]    In 2 days      Delphine Fabiana, [address] [facility] [address] [facility] [place] [address] [phone]    In 3 days  For your acid reflux      Discharge Medications:  Discharge Medication List as of 9/24/2019  3:12 PM          FINAL IMPRESSION  1. Gastroesophageal reflux disease, esophagitis presence not specified    2. Nonspecific chest pain        Blood pressure (!) 135/90, pulse 76, temperature 98.1 °F (36.7 °C), temperature source Infrared, resp. rate 13, height 5' 2\" (1.575 m), weight 125 lb (56.7 kg), SpO2 100 %. For further details of Zina Mcdonough emergency department encounter, please see documentation by advanced practice provider, Irene Payton NP.     Jaycee Miguel DO (electronically signed)  Attending Emergency Physician       Jaycee Miguel DO  09/25/19 9874

## 2021-06-03 ENCOUNTER — APPOINTMENT (OUTPATIENT)
Dept: GENERAL RADIOLOGY | Age: 56
DRG: 042 | End: 2021-06-03
Payer: MEDICARE

## 2021-06-03 ENCOUNTER — HOSPITAL ENCOUNTER (INPATIENT)
Age: 56
LOS: 4 days | Discharge: NURSING, REHAB OR LONG-TERM CARE | DRG: 042 | End: 2021-06-08
Attending: EMERGENCY MEDICINE | Admitting: INTERNAL MEDICINE
Payer: MEDICARE

## 2021-06-03 ENCOUNTER — APPOINTMENT (OUTPATIENT)
Dept: CT IMAGING | Age: 56
DRG: 042 | End: 2021-06-03
Payer: MEDICARE

## 2021-06-03 DIAGNOSIS — E11.9 TYPE 2 DIABETES MELLITUS WITHOUT COMPLICATION, WITHOUT LONG-TERM CURRENT USE OF INSULIN (HCC): ICD-10-CM

## 2021-06-03 DIAGNOSIS — I10 ESSENTIAL HYPERTENSION: ICD-10-CM

## 2021-06-03 DIAGNOSIS — R41.82 ALTERED MENTAL STATUS, UNSPECIFIED ALTERED MENTAL STATUS TYPE: Primary | ICD-10-CM

## 2021-06-03 LAB
AMPHETAMINE SCREEN, URINE: NORMAL
BARBITURATE SCREEN URINE: NORMAL
BASE EXCESS VENOUS: -1 MMOL/L (ref -3–3)
BENZODIAZEPINE SCREEN, URINE: NORMAL
BILIRUBIN URINE: NEGATIVE
BLOOD, URINE: ABNORMAL
CANNABINOID SCREEN URINE: NORMAL
CARBOXYHEMOGLOBIN: 3.1 % (ref 0–1.5)
CLARITY: CLEAR
COCAINE METABOLITE SCREEN URINE: NORMAL
COLOR: YELLOW
EPITHELIAL CELLS, UA: 0 /HPF (ref 0–5)
GLUCOSE URINE: >=1000 MG/DL
HCO3 VENOUS: 25.6 MMOL/L (ref 23–29)
HEMOGLOBIN, VEN, REDUCED: 24 %
HYALINE CASTS: 0 /LPF (ref 0–8)
KETONES, URINE: 40 MG/DL
LEUKOCYTE ESTERASE, URINE: NEGATIVE
Lab: NORMAL
METHADONE SCREEN, URINE: NORMAL
METHEMOGLOBIN VENOUS: 0.2 %
MICROSCOPIC EXAMINATION: YES
NITRITE, URINE: NEGATIVE
O2 CONTENT, VEN: 15 VOL %
O2 SAT, VEN: 75 %
O2 THERAPY: ABNORMAL
OPIATE SCREEN URINE: NORMAL
OXYCODONE URINE: NORMAL
PCO2, VEN: 48.5 MMHG (ref 40–50)
PH UA: 5.5
PH UA: 5.5 (ref 5–8)
PH VENOUS: 7.33 (ref 7.35–7.45)
PHENCYCLIDINE SCREEN URINE: NORMAL
PO2, VEN: 41.8 MMHG (ref 25–40)
PROPOXYPHENE SCREEN: NORMAL
PROTEIN UA: 30 MG/DL
RBC UA: 0 /HPF (ref 0–4)
SPECIFIC GRAVITY UA: >1.03 (ref 1–1.03)
TCO2 CALC VENOUS: 61 MMOL/L
URINE TYPE: ABNORMAL
UROBILINOGEN, URINE: 0.2 E.U./DL
WBC UA: 1 /HPF (ref 0–5)

## 2021-06-03 PROCEDURE — 80307 DRUG TEST PRSMV CHEM ANLYZR: CPT

## 2021-06-03 PROCEDURE — 82803 BLOOD GASES ANY COMBINATION: CPT

## 2021-06-03 PROCEDURE — 85025 COMPLETE CBC W/AUTO DIFF WBC: CPT

## 2021-06-03 PROCEDURE — 83036 HEMOGLOBIN GLYCOSYLATED A1C: CPT

## 2021-06-03 PROCEDURE — 80143 DRUG ASSAY ACETAMINOPHEN: CPT

## 2021-06-03 PROCEDURE — 83880 ASSAY OF NATRIURETIC PEPTIDE: CPT

## 2021-06-03 PROCEDURE — 81001 URINALYSIS AUTO W/SCOPE: CPT

## 2021-06-03 PROCEDURE — 80053 COMPREHEN METABOLIC PANEL: CPT

## 2021-06-03 PROCEDURE — 82077 ASSAY SPEC XCP UR&BREATH IA: CPT

## 2021-06-03 PROCEDURE — 83605 ASSAY OF LACTIC ACID: CPT

## 2021-06-03 PROCEDURE — 83690 ASSAY OF LIPASE: CPT

## 2021-06-03 PROCEDURE — 71045 X-RAY EXAM CHEST 1 VIEW: CPT

## 2021-06-03 PROCEDURE — 99284 EMERGENCY DEPT VISIT MOD MDM: CPT

## 2021-06-03 PROCEDURE — 80179 DRUG ASSAY SALICYLATE: CPT

## 2021-06-03 PROCEDURE — 85652 RBC SED RATE AUTOMATED: CPT

## 2021-06-03 PROCEDURE — 84484 ASSAY OF TROPONIN QUANT: CPT

## 2021-06-03 PROCEDURE — 84443 ASSAY THYROID STIM HORMONE: CPT

## 2021-06-03 PROCEDURE — 87086 URINE CULTURE/COLONY COUNT: CPT

## 2021-06-03 PROCEDURE — 70450 CT HEAD/BRAIN W/O DYE: CPT

## 2021-06-03 PROCEDURE — 36415 COLL VENOUS BLD VENIPUNCTURE: CPT

## 2021-06-03 PROCEDURE — 86140 C-REACTIVE PROTEIN: CPT

## 2021-06-03 PROCEDURE — 84425 ASSAY OF VITAMIN B-1: CPT

## 2021-06-03 PROCEDURE — 82150 ASSAY OF AMYLASE: CPT

## 2021-06-03 RX ORDER — 0.9 % SODIUM CHLORIDE 0.9 %
1000 INTRAVENOUS SOLUTION INTRAVENOUS ONCE
Status: DISCONTINUED | OUTPATIENT
Start: 2021-06-03 | End: 2021-06-04 | Stop reason: HOSPADM

## 2021-06-04 ENCOUNTER — APPOINTMENT (OUTPATIENT)
Dept: MRI IMAGING | Age: 56
DRG: 042 | End: 2021-06-04
Payer: MEDICARE

## 2021-06-04 PROBLEM — F03.90 DEMENTIA (HCC): Status: ACTIVE | Noted: 2021-06-04

## 2021-06-04 PROBLEM — F05 ACUTE CONFUSIONAL STATE: Status: ACTIVE | Noted: 2021-06-04

## 2021-06-04 LAB
A/G RATIO: 1.1 (ref 1.1–2.2)
A/G RATIO: 1.2 (ref 1.1–2.2)
ACETAMINOPHEN LEVEL: <5 UG/ML (ref 10–30)
ALBUMIN SERPL-MCNC: 4 G/DL (ref 3.4–5)
ALBUMIN SERPL-MCNC: 4.6 G/DL (ref 3.4–5)
ALP BLD-CCNC: 123 U/L (ref 40–129)
ALP BLD-CCNC: 139 U/L (ref 40–129)
ALT SERPL-CCNC: 28 U/L (ref 10–40)
ALT SERPL-CCNC: 35 U/L (ref 10–40)
AMMONIA: 22 UMOL/L (ref 11–51)
AMYLASE: 196 U/L (ref 25–115)
ANION GAP SERPL CALCULATED.3IONS-SCNC: 12 MMOL/L (ref 3–16)
ANION GAP SERPL CALCULATED.3IONS-SCNC: 18 MMOL/L (ref 3–16)
APTT: 20.8 SEC (ref 24.2–36.2)
AST SERPL-CCNC: 69 U/L (ref 15–37)
AST SERPL-CCNC: 84 U/L (ref 15–37)
BASOPHILS ABSOLUTE: 0 K/UL (ref 0–0.2)
BASOPHILS ABSOLUTE: 0.1 K/UL (ref 0–0.2)
BASOPHILS RELATIVE PERCENT: 0.4 %
BASOPHILS RELATIVE PERCENT: 0.7 %
BILIRUB SERPL-MCNC: 0.4 MG/DL (ref 0–1)
BILIRUB SERPL-MCNC: 0.6 MG/DL (ref 0–1)
BUN BLDV-MCNC: 17 MG/DL (ref 7–20)
BUN BLDV-MCNC: 17 MG/DL (ref 7–20)
C-REACTIVE PROTEIN: 6.3 MG/L (ref 0–5.1)
CALCIUM SERPL-MCNC: 10.4 MG/DL (ref 8.3–10.6)
CALCIUM SERPL-MCNC: 9.5 MG/DL (ref 8.3–10.6)
CHLORIDE BLD-SCNC: 97 MMOL/L (ref 99–110)
CHLORIDE BLD-SCNC: 97 MMOL/L (ref 99–110)
CO2: 22 MMOL/L (ref 21–32)
CO2: 24 MMOL/L (ref 21–32)
CREAT SERPL-MCNC: 0.8 MG/DL (ref 0.6–1.1)
CREAT SERPL-MCNC: 0.9 MG/DL (ref 0.6–1.1)
EKG ATRIAL RATE: 92 BPM
EKG DIAGNOSIS: NORMAL
EKG P AXIS: 73 DEGREES
EKG P-R INTERVAL: 160 MS
EKG Q-T INTERVAL: 376 MS
EKG QRS DURATION: 78 MS
EKG QTC CALCULATION (BAZETT): 464 MS
EKG R AXIS: 38 DEGREES
EKG T AXIS: 59 DEGREES
EKG VENTRICULAR RATE: 92 BPM
EOSINOPHILS ABSOLUTE: 0 K/UL (ref 0–0.6)
EOSINOPHILS ABSOLUTE: 0 K/UL (ref 0–0.6)
EOSINOPHILS RELATIVE PERCENT: 0.1 %
EOSINOPHILS RELATIVE PERCENT: 0.2 %
ESTIMATED AVERAGE GLUCOSE: 369.5 MG/DL
ESTIMATED AVERAGE GLUCOSE: 378.1 MG/DL
ETHANOL: NORMAL MG/DL (ref 0–0.08)
FOLATE: 17.96 NG/ML (ref 4.78–24.2)
GFR AFRICAN AMERICAN: >60
GFR AFRICAN AMERICAN: >60
GFR NON-AFRICAN AMERICAN: >60
GFR NON-AFRICAN AMERICAN: >60
GLOBULIN: 3.4 G/DL
GLOBULIN: 4.2 G/DL
GLUCOSE BLD-MCNC: 104 MG/DL (ref 70–99)
GLUCOSE BLD-MCNC: 145 MG/DL (ref 70–99)
GLUCOSE BLD-MCNC: 201 MG/DL (ref 70–99)
GLUCOSE BLD-MCNC: 344 MG/DL (ref 70–99)
GLUCOSE BLD-MCNC: 473 MG/DL (ref 70–99)
GLUCOSE BLD-MCNC: 528 MG/DL (ref 70–99)
GLUCOSE BLD-MCNC: 570 MG/DL (ref 70–99)
GLUCOSE BLD-MCNC: 577 MG/DL (ref 70–99)
GLUCOSE BLD-MCNC: 63 MG/DL (ref 70–99)
GLUCOSE BLD-MCNC: 64 MG/DL (ref 70–99)
GLUCOSE BLD-MCNC: 89 MG/DL (ref 70–99)
HBA1C MFR BLD: 14.5 %
HBA1C MFR BLD: 14.8 %
HCT VFR BLD CALC: 39.8 % (ref 36–48)
HCT VFR BLD CALC: 44 % (ref 36–48)
HEMOGLOBIN: 12.9 G/DL (ref 12–16)
HEMOGLOBIN: 14.4 G/DL (ref 12–16)
INR BLD: 1.02 (ref 0.86–1.14)
LACTIC ACID, SEPSIS: 2.3 MMOL/L (ref 0.4–1.9)
LACTIC ACID: 3.5 MMOL/L (ref 0.4–2)
LACTIC ACID: 3.8 MMOL/L (ref 0.4–2)
LIPASE: 22 U/L (ref 13–60)
LYMPHOCYTES ABSOLUTE: 1.1 K/UL (ref 1–5.1)
LYMPHOCYTES ABSOLUTE: 1.2 K/UL (ref 1–5.1)
LYMPHOCYTES RELATIVE PERCENT: 11.5 %
LYMPHOCYTES RELATIVE PERCENT: 16.9 %
MCH RBC QN AUTO: 30.9 PG (ref 26–34)
MCH RBC QN AUTO: 31 PG (ref 26–34)
MCHC RBC AUTO-ENTMCNC: 32.6 G/DL (ref 31–36)
MCHC RBC AUTO-ENTMCNC: 32.8 G/DL (ref 31–36)
MCV RBC AUTO: 94.2 FL (ref 80–100)
MCV RBC AUTO: 95.2 FL (ref 80–100)
MONOCYTES ABSOLUTE: 0.5 K/UL (ref 0–1.3)
MONOCYTES ABSOLUTE: 0.5 K/UL (ref 0–1.3)
MONOCYTES RELATIVE PERCENT: 5.6 %
MONOCYTES RELATIVE PERCENT: 7.8 %
NEUTROPHILS ABSOLUTE: 5.2 K/UL (ref 1.7–7.7)
NEUTROPHILS ABSOLUTE: 7.6 K/UL (ref 1.7–7.7)
NEUTROPHILS RELATIVE PERCENT: 74.7 %
NEUTROPHILS RELATIVE PERCENT: 82.1 %
PDW BLD-RTO: 13.1 % (ref 12.4–15.4)
PDW BLD-RTO: 13.1 % (ref 12.4–15.4)
PERFORMED ON: ABNORMAL
PERFORMED ON: NORMAL
PLATELET # BLD: 274 K/UL (ref 135–450)
PLATELET # BLD: 307 K/UL (ref 135–450)
PMV BLD AUTO: 7.2 FL (ref 5–10.5)
PMV BLD AUTO: 7.6 FL (ref 5–10.5)
POTASSIUM REFLEX MAGNESIUM: 4 MMOL/L (ref 3.5–5.1)
POTASSIUM REFLEX MAGNESIUM: 4.5 MMOL/L (ref 3.5–5.1)
PRO-BNP: 106 PG/ML (ref 0–124)
PROTHROMBIN TIME: 11.8 SEC (ref 10–13.2)
RBC # BLD: 4.18 M/UL (ref 4–5.2)
RBC # BLD: 4.67 M/UL (ref 4–5.2)
REASON FOR REJECTION: NORMAL
REJECTED TEST: NORMAL
SALICYLATE, SERUM: <0.3 MG/DL (ref 15–30)
SEDIMENTATION RATE, ERYTHROCYTE: 24 MM/HR (ref 0–30)
SODIUM BLD-SCNC: 133 MMOL/L (ref 136–145)
SODIUM BLD-SCNC: 137 MMOL/L (ref 136–145)
TOTAL PROTEIN: 7.4 G/DL (ref 6.4–8.2)
TOTAL PROTEIN: 8.8 G/DL (ref 6.4–8.2)
TROPONIN: <0.01 NG/ML
TSH REFLEX: 0.59 UIU/ML (ref 0.27–4.2)
VITAMIN B-12: >2000 PG/ML (ref 211–911)
WBC # BLD: 6.9 K/UL (ref 4–11)
WBC # BLD: 9.3 K/UL (ref 4–11)

## 2021-06-04 PROCEDURE — 85730 THROMBOPLASTIN TIME PARTIAL: CPT

## 2021-06-04 PROCEDURE — 1200000000 HC SEMI PRIVATE

## 2021-06-04 PROCEDURE — 82607 VITAMIN B-12: CPT

## 2021-06-04 PROCEDURE — 97535 SELF CARE MNGMENT TRAINING: CPT

## 2021-06-04 PROCEDURE — 70551 MRI BRAIN STEM W/O DYE: CPT

## 2021-06-04 PROCEDURE — 97166 OT EVAL MOD COMPLEX 45 MIN: CPT

## 2021-06-04 PROCEDURE — 85610 PROTHROMBIN TIME: CPT

## 2021-06-04 PROCEDURE — 6370000000 HC RX 637 (ALT 250 FOR IP): Performed by: INTERNAL MEDICINE

## 2021-06-04 PROCEDURE — 85652 RBC SED RATE AUTOMATED: CPT

## 2021-06-04 PROCEDURE — 83036 HEMOGLOBIN GLYCOSYLATED A1C: CPT

## 2021-06-04 PROCEDURE — 97161 PT EVAL LOW COMPLEX 20 MIN: CPT

## 2021-06-04 PROCEDURE — 85025 COMPLETE CBC W/AUTO DIFF WBC: CPT

## 2021-06-04 PROCEDURE — 36415 COLL VENOUS BLD VENIPUNCTURE: CPT

## 2021-06-04 PROCEDURE — 93010 ELECTROCARDIOGRAM REPORT: CPT | Performed by: INTERNAL MEDICINE

## 2021-06-04 PROCEDURE — 80053 COMPREHEN METABOLIC PANEL: CPT

## 2021-06-04 PROCEDURE — 6360000002 HC RX W HCPCS: Performed by: INTERNAL MEDICINE

## 2021-06-04 PROCEDURE — 2580000003 HC RX 258: Performed by: INTERNAL MEDICINE

## 2021-06-04 PROCEDURE — 93005 ELECTROCARDIOGRAM TRACING: CPT | Performed by: EMERGENCY MEDICINE

## 2021-06-04 PROCEDURE — 99222 1ST HOSP IP/OBS MODERATE 55: CPT | Performed by: PSYCHIATRY & NEUROLOGY

## 2021-06-04 PROCEDURE — 97116 GAIT TRAINING THERAPY: CPT

## 2021-06-04 PROCEDURE — 83605 ASSAY OF LACTIC ACID: CPT

## 2021-06-04 PROCEDURE — 82140 ASSAY OF AMMONIA: CPT

## 2021-06-04 PROCEDURE — 82746 ASSAY OF FOLIC ACID SERUM: CPT

## 2021-06-04 RX ORDER — POLYETHYLENE GLYCOL 3350 17 G/17G
17 POWDER, FOR SOLUTION ORAL DAILY PRN
Status: DISCONTINUED | OUTPATIENT
Start: 2021-06-04 | End: 2021-06-08 | Stop reason: HOSPADM

## 2021-06-04 RX ORDER — FLUTICASONE PROPIONATE 50 MCG
1 SPRAY, SUSPENSION (ML) NASAL DAILY
Status: DISCONTINUED | OUTPATIENT
Start: 2021-06-04 | End: 2021-06-08 | Stop reason: HOSPADM

## 2021-06-04 RX ORDER — SODIUM CHLORIDE 9 MG/ML
INJECTION, SOLUTION INTRAVENOUS CONTINUOUS
Status: DISCONTINUED | OUTPATIENT
Start: 2021-06-04 | End: 2021-06-05

## 2021-06-04 RX ORDER — DEXTROSE MONOHYDRATE 50 MG/ML
100 INJECTION, SOLUTION INTRAVENOUS PRN
Status: DISCONTINUED | OUTPATIENT
Start: 2021-06-04 | End: 2021-06-04 | Stop reason: SDUPTHER

## 2021-06-04 RX ORDER — NICOTINE POLACRILEX 4 MG
15 LOZENGE BUCCAL PRN
Status: DISCONTINUED | OUTPATIENT
Start: 2021-06-04 | End: 2021-06-04 | Stop reason: SDUPTHER

## 2021-06-04 RX ORDER — ASPIRIN 81 MG/1
81 TABLET ORAL DAILY
Status: DISCONTINUED | OUTPATIENT
Start: 2021-06-04 | End: 2021-06-08 | Stop reason: HOSPADM

## 2021-06-04 RX ORDER — LISINOPRIL 5 MG/1
2.5 TABLET ORAL DAILY
Status: DISCONTINUED | OUTPATIENT
Start: 2021-06-04 | End: 2021-06-08 | Stop reason: HOSPADM

## 2021-06-04 RX ORDER — SODIUM CHLORIDE 0.9 % (FLUSH) 0.9 %
5-40 SYRINGE (ML) INJECTION PRN
Status: DISCONTINUED | OUTPATIENT
Start: 2021-06-04 | End: 2021-06-08 | Stop reason: HOSPADM

## 2021-06-04 RX ORDER — ACETAMINOPHEN 325 MG/1
650 TABLET ORAL EVERY 6 HOURS PRN
Status: DISCONTINUED | OUTPATIENT
Start: 2021-06-04 | End: 2021-06-05

## 2021-06-04 RX ORDER — INSULIN LISPRO 100 [IU]/ML
0-12 INJECTION, SOLUTION INTRAVENOUS; SUBCUTANEOUS
Status: DISCONTINUED | OUTPATIENT
Start: 2021-06-04 | End: 2021-06-05

## 2021-06-04 RX ORDER — PANTOPRAZOLE SODIUM 40 MG/1
40 TABLET, DELAYED RELEASE ORAL
Status: DISCONTINUED | OUTPATIENT
Start: 2021-06-04 | End: 2021-06-08 | Stop reason: HOSPADM

## 2021-06-04 RX ORDER — NICOTINE POLACRILEX 4 MG
15 LOZENGE BUCCAL PRN
Status: DISCONTINUED | OUTPATIENT
Start: 2021-06-04 | End: 2021-06-08 | Stop reason: HOSPADM

## 2021-06-04 RX ORDER — GLIMEPIRIDE 2 MG/1
2 TABLET ORAL 2 TIMES DAILY
Status: DISCONTINUED | OUTPATIENT
Start: 2021-06-04 | End: 2021-06-04

## 2021-06-04 RX ORDER — INSULIN LISPRO 100 [IU]/ML
15 INJECTION, SOLUTION INTRAVENOUS; SUBCUTANEOUS ONCE
Status: COMPLETED | OUTPATIENT
Start: 2021-06-04 | End: 2021-06-04

## 2021-06-04 RX ORDER — SODIUM CHLORIDE 9 MG/ML
25 INJECTION, SOLUTION INTRAVENOUS PRN
Status: DISCONTINUED | OUTPATIENT
Start: 2021-06-04 | End: 2021-06-08 | Stop reason: HOSPADM

## 2021-06-04 RX ORDER — DEXTROSE MONOHYDRATE 25 G/50ML
12.5 INJECTION, SOLUTION INTRAVENOUS PRN
Status: DISCONTINUED | OUTPATIENT
Start: 2021-06-04 | End: 2021-06-08 | Stop reason: HOSPADM

## 2021-06-04 RX ORDER — ONDANSETRON 4 MG/1
4 TABLET, ORALLY DISINTEGRATING ORAL EVERY 8 HOURS PRN
Status: DISCONTINUED | OUTPATIENT
Start: 2021-06-04 | End: 2021-06-08 | Stop reason: HOSPADM

## 2021-06-04 RX ORDER — DEXTROSE MONOHYDRATE 50 MG/ML
100 INJECTION, SOLUTION INTRAVENOUS PRN
Status: DISCONTINUED | OUTPATIENT
Start: 2021-06-04 | End: 2021-06-08 | Stop reason: HOSPADM

## 2021-06-04 RX ORDER — DEXTROSE MONOHYDRATE 25 G/50ML
12.5 INJECTION, SOLUTION INTRAVENOUS PRN
Status: DISCONTINUED | OUTPATIENT
Start: 2021-06-04 | End: 2021-06-04 | Stop reason: SDUPTHER

## 2021-06-04 RX ORDER — SODIUM CHLORIDE 0.9 % (FLUSH) 0.9 %
5-40 SYRINGE (ML) INJECTION EVERY 12 HOURS SCHEDULED
Status: DISCONTINUED | OUTPATIENT
Start: 2021-06-04 | End: 2021-06-08 | Stop reason: HOSPADM

## 2021-06-04 RX ORDER — GABAPENTIN 100 MG/1
100 CAPSULE ORAL 3 TIMES DAILY
Status: DISCONTINUED | OUTPATIENT
Start: 2021-06-04 | End: 2021-06-08 | Stop reason: HOSPADM

## 2021-06-04 RX ORDER — ACETAMINOPHEN 650 MG/1
650 SUPPOSITORY RECTAL EVERY 6 HOURS PRN
Status: DISCONTINUED | OUTPATIENT
Start: 2021-06-04 | End: 2021-06-05

## 2021-06-04 RX ORDER — INSULIN LISPRO 100 [IU]/ML
0-6 INJECTION, SOLUTION INTRAVENOUS; SUBCUTANEOUS NIGHTLY
Status: DISCONTINUED | OUTPATIENT
Start: 2021-06-04 | End: 2021-06-05

## 2021-06-04 RX ORDER — ONDANSETRON 2 MG/ML
4 INJECTION INTRAMUSCULAR; INTRAVENOUS EVERY 6 HOURS PRN
Status: DISCONTINUED | OUTPATIENT
Start: 2021-06-04 | End: 2021-06-08 | Stop reason: HOSPADM

## 2021-06-04 RX ADMIN — SODIUM CHLORIDE: 9 INJECTION, SOLUTION INTRAVENOUS at 04:10

## 2021-06-04 RX ADMIN — PANTOPRAZOLE SODIUM 40 MG: 40 TABLET, DELAYED RELEASE ORAL at 08:35

## 2021-06-04 RX ADMIN — ENOXAPARIN SODIUM 40 MG: 40 INJECTION SUBCUTANEOUS at 08:32

## 2021-06-04 RX ADMIN — INSULIN LISPRO 15 UNITS: 100 INJECTION, SOLUTION INTRAVENOUS; SUBCUTANEOUS at 06:27

## 2021-06-04 RX ADMIN — LISINOPRIL 2.5 MG: 5 TABLET ORAL at 08:32

## 2021-06-04 RX ADMIN — Medication 10 ML: at 08:33

## 2021-06-04 RX ADMIN — GLIMEPIRIDE 2 MG: 2 TABLET ORAL at 08:32

## 2021-06-04 RX ADMIN — GABAPENTIN 100 MG: 100 CAPSULE ORAL at 14:06

## 2021-06-04 RX ADMIN — ASPIRIN 81 MG: 81 TABLET, COATED ORAL at 08:31

## 2021-06-04 RX ADMIN — GABAPENTIN 100 MG: 100 CAPSULE ORAL at 21:29

## 2021-06-04 RX ADMIN — INSULIN LISPRO 4 UNITS: 100 INJECTION, SOLUTION INTRAVENOUS; SUBCUTANEOUS at 17:11

## 2021-06-04 RX ADMIN — METFORMIN HYDROCHLORIDE 1000 MG: 500 TABLET ORAL at 08:31

## 2021-06-04 RX ADMIN — FLUTICASONE PROPIONATE 1 SPRAY: 50 SPRAY, METERED NASAL at 08:35

## 2021-06-04 RX ADMIN — INSULIN GLARGINE 10 UNITS: 100 INJECTION, SOLUTION SUBCUTANEOUS at 14:06

## 2021-06-04 RX ADMIN — INSULIN LISPRO 12 UNITS: 100 INJECTION, SOLUTION INTRAVENOUS; SUBCUTANEOUS at 08:26

## 2021-06-04 RX ADMIN — GABAPENTIN 100 MG: 100 CAPSULE ORAL at 09:57

## 2021-06-04 ASSESSMENT — PAIN SCALES - GENERAL
PAINLEVEL_OUTOF10: 6
PAINLEVEL_OUTOF10: 0
PAINLEVEL_OUTOF10: 6
PAINLEVEL_OUTOF10: 8
PAINLEVEL_OUTOF10: 0
PAINLEVEL_OUTOF10: 0

## 2021-06-04 ASSESSMENT — PAIN SCALES - WONG BAKER
WONGBAKER_NUMERICALRESPONSE: 0

## 2021-06-04 ASSESSMENT — PAIN DESCRIPTION - PAIN TYPE
TYPE: CHRONIC PAIN
TYPE: CHRONIC PAIN

## 2021-06-04 ASSESSMENT — PAIN DESCRIPTION - LOCATION
LOCATION: FOOT
LOCATION: KNEE

## 2021-06-04 ASSESSMENT — PAIN DESCRIPTION - FREQUENCY
FREQUENCY: CONTINUOUS
FREQUENCY: CONTINUOUS

## 2021-06-04 ASSESSMENT — PAIN DESCRIPTION - ORIENTATION
ORIENTATION: LEFT
ORIENTATION: LEFT

## 2021-06-04 ASSESSMENT — PAIN DESCRIPTION - DESCRIPTORS
DESCRIPTORS: BURNING
DESCRIPTORS: BURNING

## 2021-06-04 NOTE — PROGRESS NOTES
.4 Eyes Skin Assessment     NAME:  Rekha Maldonado  YOB: 1965  MEDICAL RECORD NUMBER:  [id number]    The patient is being assess for  Admission    I agree that 2 RN's have performed a thorough Head to Toe Skin Assessment on the patient. ALL assessment sites listed below have been assessed. Areas assessed by both nurses:    Head, Face, Ears, Shoulders, Back, Chest, Arms, Elbows, Hands, Sacrum. Buttock, Coccyx, Ischium and Legs. Feet and Heels        Does the Patient have a Wound?  No noted wound(s)       Theodore Prevention initiated:  NA   Wound Care Orders initiated:  NA    Pressure Injury (Stage 3,4, Unstageable, DTI, NWPT, and Complex wounds) if present place consult order under [de-identified] NA    New and Established Ostomies if present place consult order under : NA      Nurse 1 eSignature: Electronically signed by Gay Mathias RN on 6/4/21 at 4:38 AM EDT    **SHARE this note so that the co-signing nurse is able to place an eSignature**    Nurse 2 eSignature: Electronically signed by Edgardo Castaneda RN on 6/4/21 at 5:23 AM EDT

## 2021-06-04 NOTE — H&P
Hospital Medicine History & Physical      PCP: Mary Alexandra MD    Date of Admission: 6/3/2021    Date of Service: Pt seen/examined on 6/4/2021  and Admitted to Inpatient with expected LOS greater than two midnights due to medical therapy. Chief Complaint:    Chief Complaint   Patient presents with    Altered Mental Status     pt brought by Britni More after being found walking the street and a complete stranger tried to take her home but she was unable to tell him where she lived, stranger called police and ems brought her here           History Of Present Illness: The patient is a 55 y.o. female with history of type 2 diabetes, hypertension, hyperlipidemia, cognitive impairment who was found wandering the streets [place] [address] disoriented to place time and brought to the ER for further evaluation. At the time of evaluation, patient remains disoriented reports to have recently lost her mother and father and now lives with her grandma. She expresses that she has stress that her grandmother is not doing well. She denies any headaches, no visual disturbances, no hallucinations. No cough or production, no shortness of breath, no fevers or chills. No constipation or diarrhea. No dysuria or hematuria. CT brain was unremarkable  Urine toxicology screen was unremarkable  Urinalysis unremarkable  Chest x-ray with no acute pathology    Past Medical History:        Diagnosis Date    Diabetes mellitus ([address]     Hyperlipidemia     Hypertension        Past Surgical History:        Procedure Laterality Date    CHOLECYSTECTOMY      FINGER SURGERY         Medications Prior to Admission:    Prior to Admission medications    Medication Sig Start Date End Date Taking?  Authorizing Provider   sucralfate (CARAFATE) 1 GM tablet Take 1 tablet by mouth every 6 hours as needed (abdominal discomfort) 9/24/19   Chantel Barry, DO   MULTIPLE VITAMINS-MINERALS ER PO Take by mouth    Historical Provider, MD desloratadine (CLARINEX REDITAB) 5 MG disintegrating tablet One pill daily. 5/6/19   Historical Provider, MD   Vitamin E 400 units TABS Take by mouth    Historical Provider, MD   blood glucose test strips (ASCENSIA AUTODISC VI;ONE TOUCH ULTRA TEST VI) strip Check blood  Sugars once a day. 1/22/16   Historical Provider, MD   pantoprazole (PROTONIX) 20 MG tablet Take 20 mg by mouth 6/10/19   Historical Provider, MD   fluticasone (FLONASE) 50 MCG/ACT nasal spray 1 spray by Each Nare route daily 3/23/19   MARIAN Phelps   aspirin 81 MG tablet Take 81 mg by mouth daily    Historical Provider, MD   metformin (GLUCOPHAGE) 500 MG tablet Take 500 mg by mouth 2 times daily (with meals). Historical Provider, MD   glimepiride (AMARYL) 2 MG tablet Take 2 mg by mouth 2 times daily. Historical Provider, MD   magnesium oxide (MAG-) 400 MG tablet Take 1 tablet by mouth daily. Patient not taking: Reported on 8/28/2019 4/24/12   Isaac Bah DO   lisinopril (PRINIVIL;ZESTRIL) 10 MG tablet Take 10 mg by mouth daily. Historical Provider, MD       Allergies:  Patient has no known allergies. Social History:  The patient currently lives alone    TOBACCO:   reports that she has never smoked. She has never used smokeless tobacco.  ETOH:   reports no history of alcohol use. Family History:  Reviewed in detail and negative for DM, Early CAD, Cancer, CVA. Positive as follows:    History reviewed. No pertinent family history. REVIEW OF SYSTEMS:   Pleasantly confused and not able to provide good history    PHYSICAL EXAM:    /80   Pulse 94   Temp 97.9 °F (36.6 °C) (Oral)   Resp 18   Ht 5' 3\" (1.6 m)   Wt 125 lb (56.7 kg)   SpO2 100%   BMI 22.14 kg/m²     General appearance: No apparent distress appears stated age and cooperative. Pleasantly confused  HEENT Normal cephalic, atraumatic without obvious deformity. Pupils equal, round, and reactive to light. Extra ocular muscles intact.

## 2021-06-04 NOTE — PROGRESS NOTES
Physical Therapy    Facility/Department: [address] ONCOLOGY  Initial Assessment    NAME: Aditi Pa  : 1965  MRN: [id number]    Date of Service: 2021    Discharge Recommendations: Aditi Pa scored a 22/24 on the AM-PAC short mobility form. At this time, no further PT is recommended upon discharge due to being near physical mobility baseline. However, patient's cognitive status does bring safety concerns and her impaired memory may limit her ability to care for her elderly grandmother. Recommend patient returns to prior setting with prior services. The above recommendations are pending patient's progress and return of cognition during this admission. Continue to assess pending progress   PT Equipment Recommendations  Equipment Needed: No  Other: No physical mobility need    Assessment   Body structures, Functions, Activity limitations: Decreased balance;Decreased cognition;Decreased safe awareness  Assessment: Pt is a 55 y.o. female who arrived to the ED after being found wandering alone in [place]. Upon admit, pt's blood sugar was 577. Pt presents this morning with transient cognition, oriented to person (including ) and situation, but is unable to recall time and place, even after prompting. Pt performs safe mobility in room with SBA, but cognitive status limits patient safety at this time. Pt would benefir from acute PT services to improve balance to increase safety with functional mobility. Prognosis: Excellent  Decision Making: Low Complexity  Exam: ROM, transfers, balance, gait  PT Education: Goals; General Safety;Gait Training;Orientation;Transfer Training  Patient Education: Pt will need reinforcement d/t cognitive status.   Barriers to Learning: Cognition  REQUIRES PT FOLLOW UP: Yes  Activity Tolerance  Activity Tolerance: Patient Tolerated treatment well;Patient limited by cognitive status       Patient Diagnosis(es): The primary encounter diagnosis was Altered mental Independent  Homemaking Assistance: Independent  Ambulation Assistance: Independent  Transfer Assistance: Independent  Active : Yes  Occupation: Retired  Additional Comments: Denies recent falls, although ED note mentions a fall prior to admit. Pt perseverates on status of grandmother whom she reports in not doing well. Cognition   Cognition  Overall Cognitive Status: Exceptions  Arousal/Alertness: Appropriate responses to stimuli  Following Commands: Follows one step commands with repetition  Attention Span: Attends with cues to redirect; Difficulty dividing attention  Memory: Decreased short term memory;Decreased recall of recent events  Safety Judgement: Decreased awareness of need for assistance  Problem Solving: Assistance required to generate solutions  Initiation: Requires cues for some  Sequencing: Requires cues for some    Objective     Observation/Palpation  Posture: Good    AROM RLE (degrees)  RLE AROM: WFL  AROM LLE (degrees)  LLE AROM : WFL  Strength RLE  Strength RLE: WFL  Strength LLE  Strength LLE: WFL  Tone RLE  RLE Tone: Normotonic  Tone LLE  LLE Tone: Normotonic  Motor Control  Gross Motor?: WFL  Sensation  Overall Sensation Status: WFL  Bed mobility  Supine to Sit: Stand by assistance (HOB slightly elevated.)  Scooting: Stand by assistance  Transfers  Sit to Stand: Stand by assistance  Stand to sit: Stand by assistance  Ambulation  Ambulation?: Yes  Ambulation 1  Surface: level tile  Device: No Device  Assistance: Stand by assistance;Contact guard assistance  Gait Deviations: Staggers  Distance: 175ft  Comments: Pt ambulates in room and in hallway with SBA to CGA (CGA around bed and turning corners due to mild staggering) with assist for IV pole management.   Stairs/Curb  Stairs?: No     Balance  Posture: Good  Sitting - Static: Good  Sitting - Dynamic: Good  Standing - Static: Good  Standing - Dynamic: Fair;+  Comments: Pt is SBA for sitting balance (EOB and commode ~2 mins each) and SBA to CGA for standing balance with no AD. Plan   Plan  Times per week: 3-5  Times per day: Daily  Current Treatment Recommendations: Strengthening, ROM, Balance Training, Endurance Training, Functional Mobility Training, Transfer Training, Gait Training, Stair training, Cognitive/Perceptual Training, Cognitive Reorientation  Safety Devices  Type of devices: All fall risk precautions in place, Call light within reach, Chair alarm in place, Gait belt, Patient at risk for falls, Telesitter in use, Left in chair  Restraints  Initially in place: No    AM-PAC Score  AM-PAC Inpatient Mobility Raw Score : 22 (06/04/21 1142)  AM-PAC Inpatient T-Scale Score : 53.28 (06/04/21 1142)  Mobility Inpatient CMS 0-100% Score: 20.91 (06/04/21 1142)  Mobility Inpatient CMS G-Code Modifier : CJ (06/04/21 1142)    Goals  Short term goals  Time Frame for Short term goals: To be met at discharge. Short term goal 1: Pt will perform transfers with [place]  Short term goal 2: Pt will maintain static/dynamic balance for 10 min with supervision  Short term goal 3: Pt will ambulate 350 ft with LRAD and mod I  Short term goal 4: Pt will ascend/descend 14 steps with mod I       Therapy Time   Individual Concurrent Group Co-treatment   Time In 0830         Time Out 0859         Minutes 29         Timed Code Treatment Minutes: 14 Minutes      NITIN Glass  PT providing direct supervision during session and assisting in making skilled judgements throughout session.   Arabella Atkins, PT, DPT, [id number]  Arabella Atkins, PT

## 2021-06-04 NOTE — PROGRESS NOTES
Pt return to room. Bed alarm set. Safety camera in place.  Electronically signed by Tyler Gonzales RN on 6/4/2021 at 4:41 PM

## 2021-06-04 NOTE — CONSULTS
In patient Neurology consult        [facility] Neurology      MD Lourdes Galdamez Yasir  1965    Date of Service: 6/4/2021    Referring Physician: Nevin Alford MD    Most of the history was obtained from detailed chart reviewing and discussion with the patient's primary team . The patient is currently confused and unable to provide me with accurate history. Reason for the consult and CC: Acute on chronic encephalopathy    HPI:   The patient is a 55y.o.  years old female with history of diabetes, hypertension and hyperlipidemia who was admitted to the hospital last night with acute confusion. She was found wandering the street to [facility] confused and disorientated. Unclear duration but could be several minutes to hours. Degree was severe. No witnessed seizure or falling or injury. No other relieving or aggravating factors. No clear triggers. She came into the ED where she was admitted. Initial work-up with CT head, UDS and blood test were unremarkable. Patient is awake and alert. Apparently and according to history, she does have baseline cognitive impairment for quite some time. She lives with her grandmother. She recently lost her parents. She denies any headache, dysphagia or dysarthria. No other new symptoms today. Unable to give any more history. Other review of system was limited. History reviewed. No pertinent family history.       Past Medical History:   Diagnosis Date    Diabetes mellitus ([address]     Hyperlipidemia     Hypertension      Past Surgical History:   Procedure Laterality Date    CHOLECYSTECTOMY      FINGER SURGERY       Social History     Tobacco Use    Smoking status: Never Smoker    Smokeless tobacco: Never Used   Substance Use Topics    Alcohol use: No    Drug use: No     No Known Allergies  Current Facility-Administered Medications   Medication Dose Route Frequency Provider Last Rate Last Admin    aspirin EC tablet 81 mg  81 mg Oral Daily Brad Mariscal MD   81 mg at 06/04/21 0831    fluticasone (FLONASE) 50 MCG/ACT nasal spray 1 spray  1 spray Each Nostril Daily Brad Mariscal MD   1 spray at 06/04/21 0835    lisinopril (PRINIVIL;ZESTRIL) tablet 2.5 mg  2.5 mg Oral Daily Brad Mariscal MD   2.5 mg at 06/04/21 0832    pantoprazole (PROTONIX) tablet 40 mg  40 mg Oral QAM AC Brad Mariscal MD   40 mg at 06/04/21 0835    sodium chloride flush 0.9 % injection 5-40 mL  5-40 mL Intravenous 2 times per day Aguilar Cavanaugh MD   10 mL at 06/04/21 0833    sodium chloride flush 0.9 % injection 5-40 mL  5-40 mL Intravenous PRN Brad Mariscal MD        0.9 % sodium chloride infusion  25 mL Intravenous PRN Brad Mariscal MD        enoxaparin (LOVENOX) injection 40 mg  40 mg Subcutaneous Daily Brad Mariscal MD   40 mg at 06/04/21 0832    ondansetron (ZOFRAN-ODT) disintegrating tablet 4 mg  4 mg Oral Q8H PRN Brad Mariscal MD        Or    ondansetron (ZOFRAN) injection 4 mg  4 mg Intravenous Q6H PRN Brad Mariscal MD        polyethylene glycol (GLYCOLAX) packet 17 g  17 g Oral Daily PRN Brad Mariscal MD        acetaminophen (TYLENOL) tablet 650 mg  650 mg Oral Q6H PRN Brad Mariscal MD        Or    acetaminophen (TYLENOL) suppository 650 mg  650 mg Rectal Q6H PRN Brad Mariscal MD        0.9 % sodium chloride infusion   Intravenous Continuous Brad Mariscal MD 75 mL/hr at 06/04/21 0410 New Bag at 06/04/21 0410    glucose (GLUTOSE) 40 % oral gel 15 g  15 g Oral PRN Brad Mariscal MD        dextrose 50 % IV solution  12.5 g Intravenous PRN Brad Mariscal MD        glucagon (rDNA) injection 1 mg  1 mg Intramuscular PRN Brad Mariscal MD        dextrose 5 % solution  100 mL/hr Intravenous PRN Brad Mariscal MD        insulin lispro (1 Unit Dial) 0-12 Units  0-12 Units Subcutaneous TID  Brad Mariscal MD   12 Units at 06/04/21 0826    insulin lispro (1 Unit Dial) 0-6 Units  0-6 Units Subcutaneous Nightly Brad Mariscal MD        gabapentin (NEURONTIN) capsule 100 mg  100 mg Oral TID Dinorah Tena MD   100 mg at 06/04/21 1406    insulin glargine (LANTUS;BASAGLAR) injection pen 10 Units  10 Units Subcutaneous Daily Dinorah Tena MD   10 Units at 06/04/21 1406       ROS: 10-14 system review was limited due to MS changes or as per HPI. Constitutional:   Vitals:    06/04/21 0823 06/04/21 1015 06/04/21 1200 06/04/21 1410   BP: 135/79 110/70 113/68 (!) 92/58   Pulse: 107 96 96 97   Resp: 16 16 16 16   Temp: 97.5 °F (36.4 °C) 98.3 °F (36.8 °C)  97.3 °F (36.3 °C)   TempSrc: Axillary Oral  Axillary   SpO2: 100% 100% 98% 99%   Weight:       Height:           General appearance: Confused   Eye: Fundus of the eye: Optic disc is difficult to obtain due to poor cooperation from the patient  Neck: supple  Cardiovascular: No lower leg edema with good pulsation. No carotid bruit. Mental Status:   AAO times one, to herself only  Poor attention and concentration. Waxing and waning. Unable to assess recent or remote memory due to confusion  Language: Fluent but with poor comprehension and not following directions  Unable to assess fund of knowledge due to confusion. Cranial Nerves:   II: Visual fields: full. Pupils: equal, round, reactive to light  III,IV,VI: Extra Ocular Movements are intact. No nystagmus  V: Facial sensation : normal  VII: Facial strength and movements: intact and symmetric  VIII: Hearing: can track my voice  IX: Palate elevation symmetric   XI: Shoulder shrug: symmetric  XII: Tongue movements: midline  Musculoskeletal:  The patient can move all 4 extremities. No apparent focal weakness. Tone: Normal tone. No rigidity. Reflexes: Symmetric 2+ in both arms and 2+ in the legs. Planters: flexor bilaterally. Coordination: No abnormal movement  Sensation: No sensory loss.   Gait/Posture: Cannot be tested due to poor cooperation intended

## 2021-06-04 NOTE — PROGRESS NOTES
Blood sugar upon arrival to unit was 577, provider messaged. Message read:   Pt is a 56 y/o female here for acute confusional state. Pt has just arrived unit and blood sugar is 577. Pt has no insulin orders, she has a history of type 2 diabetes but takes metformin at home. Please advice. Thank you. Awaiting response.

## 2021-06-04 NOTE — ED PROVIDER NOTES
[facility]        Pt Name: Leif Bennett  MRN: [id number]  [place] 1965  Date of evaluation: 6/3/2021  Provider: Yao Andres MD  PCP: Mateo Peters MD    This patient was seen and evaluated by the attending physician Yao Andres MD.      [address] [place] [address]       Chief Complaint   Patient presents with    Altered Mental Status     pt brought by Dinh Valverde after being found walking the street and a complete stranger tried to take her home but she was unable to tell him where she lived, stranger called police and ems brought her here        HISTORY OF PRESENT ILLNESS   (Location/Symptom, Timing/Onset, Context/Setting, Quality, Duration, Modifying Factors, Severity)  Note limiting factors. Leif Bennett is a 55 y.o. female pylori EMS for altered mental status. She was in a walking streets from [facility] and seemed confused. Somehow she fell down the car and a stranger offered to take her home present we given address and so change from the police and she was brought here. The patient states she is under a lot of stress since her mom  recently and she moved up here to live with her grandmother somewhere in [facility]. Patient is herself 55years old. She is alert and oriented to her name only. She cannot tell me the name of this hospital, she cannot tell me the month day year day of the week. Nursing Notes were all reviewed and agreed with or any disagreements were addressed  in the HPI. REVIEW OF SYSTEMS    (2-9 systems for level 4, 10 or more for level 5)     Review of Systems    Positives and Pertinent negatives as per HPI. Except as noted abovein the ROS, all other systems were reviewed and negative.        PAST MEDICAL HISTORY     Past Medical History:   Diagnosis Date    Diabetes mellitus ([facility] [address]     Hyperlipidemia     Hypertension          SURGICAL HISTORY     Past Surgical History: Procedure Laterality Date    CHOLECYSTECTOMY      FINGER SURGERY           CURRENTMEDICATIONS       Previous Medications    ASPIRIN 81 MG TABLET    Take 81 mg by mouth daily    BLOOD GLUCOSE TEST STRIPS (ASCENSIA AUTODISC VI;ONE TOUCH ULTRA TEST VI) STRIP    Check blood  Sugars once a day. DESLORATADINE (CLARINEX REDITAB) 5 MG DISINTEGRATING TABLET    One pill daily. FLUTICASONE (FLONASE) 50 MCG/ACT NASAL SPRAY    1 spray by Each Nare route daily    GLIMEPIRIDE (AMARYL) 2 MG TABLET    Take 2 mg by mouth 2 times daily. LISINOPRIL (PRINIVIL;ZESTRIL) 10 MG TABLET    Take 10 mg by mouth daily. MAGNESIUM OXIDE (MAG-) 400 MG TABLET    Take 1 tablet by mouth daily. METFORMIN (GLUCOPHAGE) 500 MG TABLET    Take 500 mg by mouth 2 times daily (with meals). MULTIPLE VITAMINS-MINERALS ER PO    Take by mouth    PANTOPRAZOLE (PROTONIX) 20 MG TABLET    Take 20 mg by mouth    SUCRALFATE (CARAFATE) 1 GM TABLET    Take 1 tablet by mouth every 6 hours as needed (abdominal discomfort)    VITAMIN E 400 UNITS TABS    Take by mouth         ALLERGIES     Patient has no known allergies. FAMILYHISTORY     History reviewed. No pertinent family history.        SOCIAL HISTORY       Social History     Socioeconomic History    Marital status: Single     Spouse name: None    Number of children: None    Years of education: None    Highest education level: None   Occupational History    None   Tobacco Use    Smoking status: Never Smoker    Smokeless tobacco: Never Used   Substance and Sexual Activity    Alcohol use: No    Drug use: No    Sexual activity: None   Other Topics Concern    None   Social History Narrative    None     Social Determinants of Health     Financial Resource Strain:     Difficulty of Paying Living Expenses:    Food Insecurity:     Worried About Running Out of Food in the Last Year:     Ran Out of Food in the Last Year:    Transportation Needs:     Lack of Transportation (Medical):  Lack of Transportation (Non-Medical):    Physical Activity:     Days of Exercise per Week:     Minutes of Exercise per Session:    Stress:     Feeling of Stress :    Social Connections:     Frequency of Communication with Friends and Family:     Frequency of Social Gatherings with Friends and Family:     Attends Christian Services:     Active Member of Clubs or Organizations:     Attends Club or Organization Meetings:     Marital Status:    Intimate Partner Violence:     Fear of Current or Ex-Partner:     Emotionally Abused:     Physically Abused:     Sexually Abused:        SCREENINGS             PHYSICAL EXAM    (up to 7 for level 4, 8 or more for level 5)     ED Triage Vitals [06/03/21 2223]   BP Temp Temp Source Pulse Resp SpO2 Height Weight   (!) 207/99 98 °F (36.7 °C) Oral 120 18 99 % 5' 3\" (1.6 m) 125 lb (56.7 kg)       Physical Exam    General Appearance:  Alert, cooperative, no distress, appears stated age. Head:  Normocephalic, without obvious abnormality, atraumatic. Eyes:  conjunctiva/corneas clear, EOM's intact. Sclera anicteric. ENT: Mucous membranes moist.   Neck: Supple, symmetrical, trachea midline, no adenopathy. No jugular venous distention. Lungs:   No Respiratory Distress. Chest Wall:  Atraumatic   Heart:  RRR no m/c/g/r   Abdomen:   Soft, NT, ND   Extremities:  Full range of motion. Pulses: Symmetric x4   Skin:  No rashes or lesions to exposed skin. Neurologic: Alert and oriented X 1. Motor grossly normal.  Speech clear. She repeats herself frequently and seems to forget any narrative that were discussing. I think she may be confabulating as well.          DIAGNOSTIC RESULTS   LABS:    Labs Reviewed   COMPREHENSIVE METABOLIC PANEL W/ REFLEX TO MG FOR LOW K - Abnormal; Notable for the following components:       Result Value    Chloride 97 (*)     Anion Gap 18 (*)     Glucose 344 (*)     Total Protein 8.8 (*)     Alkaline Phosphatase 139 (*) AST 69 (*)     All other components within normal limits    Narrative:     Performed at:  [facility] [address], [place], [address]   Phone [phone]   AMYLASE - Abnormal; Notable for the following components:    Amylase 196 (*)     All other components within normal limits    Narrative:     Performed at:  [facility] [address], [place], [address]   Phone [phone] - Abnormal; Notable for the following components:    Glucose, Ur >=1000 (*)     Ketones, Urine 40 (*)     Blood, Urine SMALL (*)     Protein, UA 30 (*)     All other components within normal limits    Narrative:     Performed at:  [facility] [address], [place], [address]   Phone [phone]   BLOOD GAS, VENOUS - Abnormal; Notable for the following components:    pH, Jarrett 7.330 (*)     pO2, Jarrett 41.8 (*)     Carboxyhemoglobin 3.1 (*)     All other components within normal limits    Narrative:     Performed at:  [facility] [address], [place], [address]   Phone [phone]   LACTATE, SEPSIS - Abnormal; Notable for the following components:    Lactic Acid, Sepsis 2.3 (*)     All other components within normal limits    Narrative:     Performed at:  [facility] [address], [place], [address]   Phone [phone]   C-REACTIVE PROTEIN - Abnormal; Notable for the following components:    CRP 6.3 (*)     All other components within normal limits    Narrative:     Performed at:  [facility] [address], [place], [address]   Phone [phone]   SALICYLATE LEVEL - Abnormal; Notable for the following components:    Salicylate, Serum <4.1 (*)     All other components within normal limits    Narrative:     Performed at:  [facility] [address], [place], [place] [address]   Phone [phone] LEVEL - Abnormal; Notable for the following components:    Acetaminophen Level <5 (*)     All other components within normal limits    Narrative:     Performed at:  [facility]  [address] [facility], [address]   Phone [phone]   CULTURE, URINE   CBC WITH AUTO DIFFERENTIAL    Narrative:     Performed at:  [facility] [address], [facility], [address]   Phone [phone]   LIPASE    Narrative:     Performed at:  [facility] [address], [facility], [address]   Phone [phone]   TROPONIN    Narrative:     Performed at:  [facility] [address], [facility], [facility]   Phone [phone]   BRAIN NATRIURETIC PEPTIDE    Narrative:     Performed at:  [facility] [address], [facility], [facility] [address]   Phone [phone]   ETHANOL    Narrative:     Performed at:  [facility]  [address] [facility], [address] [place] [address]   Phone [phone]   URINE DRUG SCREEN    Narrative:     Performed at:  [facility]  [address] [facility], [facility] [address]   Phone [phone]   MICROSCOPIC URINALYSIS    Narrative:     Performed at:  [facility] [address], [facility], [address]   Phone [phone]   PROTIME-INR   APTT   TSH WITH REFLEX   HEMOGLOBIN A1C   VITAMIN B12 & FOLATE   VITAMIN B1   LACTATE, SEPSIS   SEDIMENTATION RATE       All other labs were within normal range or not returned as of this dictation. EKG: All EKG's are interpreted by the Emergency Department Physician in the absence of a cardiologist.  Please see their note for interpretation of EKG.       RADIOLOGY:   Non-plain film images such as CT, Ultrasound and MRI are read by the radiologist. Plain radiographic images are visualized andpreliminarily interpreted by the  ED Provider with the below findings:        Interpretation [facility] Radiologist below, if available at the time of this note:    XR CHEST PORTABLE   Final Result   Unremarkable single portable upright AP view of the chest.         CT Head WO Contrast   Final Result   No acute intracranial abnormality. No results found. PROCEDURES   Unless otherwise noted below, none     Procedures    CRITICAL CARE TIME   N/A    CONSULTS:  IP CONSULT TO HOSPITALIST      EMERGENCY DEPARTMENT COURSE and DIFFERENTIAL DIAGNOSIS/MDM:   Vitals:    Vitals:    06/03/21 2223   BP: (!) 207/99   Pulse: 120   Resp: 18   Temp: 98 °F (36.7 °C)   TempSrc: Oral   SpO2: 99%   Weight: 125 lb (56.7 kg)   Height: 5' 3\" (1.6 m)       Patient was given thefollowing medications:  Medications   0.9 % sodium chloride bolus (has no administration in time range)       61-year-old female with altered mental status. I did a brief Mini-Mental Status Examination which she failed miserably. Last seen by primary medicine doctor in December 2020. Gestalt for new onset does not dementia. Referred to neurology. Has not followed up yet as best I can tell. She has history of diabetes and hypertension. Quite hypertensive here. Bit tachycardic as well. My concern would be that she probably has progressive dementia. Need to rule out other causes of metabolic encephalopathy. At present I do not think she is able to care for herself. She says she is living with her grandmother, but age of 55 I'm not clear that her grandmother would still be alive. Work-up is reviewed. Outside of a mild dehydration and elevated sugars with a very mild anion gap (I don't think DKA), its benign. CT is nonacute. Regardless I don't think she is safe to go home. I'll bring her in. FINAL IMPRESSION      1. Altered mental status, unspecified altered mental status type    2. Essential hypertension    3.  Type 2 diabetes mellitus without complication, without long-term current use of insulin [facility])          DISPOSITION/PLAN   DISPOSITION Decision To Admit 06/04/2021 12:33:58 AM      PATIENT REFERREDTO:  No follow-up provider specified.     DISCHARGE MEDICATIONS:  New Prescriptions    No medications on file       DISCONTINUED MEDICATIONS:  Discontinued Medications    No medications on file              (Please note that portions ofthis note were completed with a voice recognition program.  Efforts were made to edit the dictations but occasionally words are mis-transcribed.)    Jacob Rai MD (electronically signed)           Jacob Rai MD  06/04/21 0499

## 2021-06-04 NOTE — PROGRESS NOTES
Bedside rounding with Mateo Kumari RN. Pt resting in bed with eyes closed, respirations even and unlabored. Call light noted to be resting in pt's lap. All needs appear within reach. White board updated. Bed alarm set. Safety camera in place. Electronically signed by Annita Cordero RN on 6/4/2021 at 7:30 PM

## 2021-06-04 NOTE — CARE COORDINATION
CM asked financial services about pt's insurance today and was told it is a multiplan limited indemity plan and an  of [facility]. Pt is confused and out of bed frequently. Was found wandering and brought to ED per notes. Pt's grandmother that she lives with is also admitted and per SW on that unit also confused. There is no other emergency contacts listed. CM spent approximately 45 minutes on phone with insurance company that is listed for patient. CM called the provider number and followed prompts trying to find out if pt has any snf coverage. CM spoke to representative Tracie Enciso and was told the effective date of the insurance was June 1st 2018 and it was terminated December 31st 2019. CM spoke to [facility] in financial services and she also called and was told insurance not active. CM made blanket referrals on pt's behalf to snf's with memory care units. CM received call back from Janak Minor at [facility] [phone] who CM gave information and she is going to check system to see if she finds any active coverage and call CM back. Referrals were sent to: mitul chew, [facility] facility, [facility] in [facility], [address] [facility], [place] [address], [facility] SHANIKA Boateng, BSN  [phone]

## 2021-06-04 NOTE — PROGRESS NOTES
Hypertension. has a past surgical history that includes Finger surgery and Cholecystectomy. Treatment Diagnosis: Acute confusional state      Restrictions  Restrictions/Precautions  Restrictions/Precautions: Fall Risk (Moderate fall risk)  Required Braces or Orthoses?: No  Position Activity Restriction  Other position/activity restrictions: The patient is a 55 y.o. female with history of type 2 diabetes, hypertension, hyperlipidemia, cognitive impairment who was found wandering the streets of [place] [address] disoriented to place time and brought to the ER for further evaluation. Subjective   General  Patient assessed for rehabilitation services?: Yes  Patient Currently in Pain: Denies  Pain Assessment  Pain Assessment: 0-10  Pain Level: 6  Pain Type: Chronic pain  Pain Location: Knee  Pain Orientation: Left  Pain Descriptors: Burning  Pain Frequency: Continuous  Vital Signs  Temp: 98.3 °F (36.8 °C)  Temp Source: Oral  Pulse: 96  Heart Rate Source: Monitor  Resp: 16  BP: 110/70  BP Location: Right upper arm  Patient Position: Sitting  Level of Consciousness: Alert (0)  MEWS Score: 1  Patient Currently in Pain: Denies  Oxygen Therapy  SpO2: 100 %  Pulse Oximeter Device Mode: Intermittent  Pulse Oximeter Device Location: Finger  O2 Device: None (Room air)  Skin Assessment: Clean, dry, & intact  Social/Functional History  Social/Functional History  Lives With:  (with 80+ y.o grand mother)  Type of Home: Apartment  Home Layout: One level  Bathroom Shower/Tub: Tub/Shower unit  Bathroom Toilet: Standard  Bathroom Equipment: Shower chair  Home Equipment: [address]  ADL Assistance: Independent  Homemaking Assistance: Independent  Ambulation Assistance: Independent  Transfer Assistance: Independent  Active : Yes  Occupation: Retired  Additional Comments: Denies recent falls, although ED note mentions a fall prior to admit. Pt perseverates on status of grandmother whom she reports in not doing well. Objective        Orientation  Overall Orientation Status: Impaired  Orientation Level: Disoriented to person;Disoriented to place; Disoriented to time;Disoriented to situation  Observation/Palpation  Posture: Good  Balance  Sitting Balance: Independent  Standing Balance: Supervision  Standing Balance  Time: 4 min x2  Activity: ADL completion, functional mobility  Functional Mobility  Functional - Mobility Device: No device  Activity: To/from bathroom; Other  Assist Level: Supervision  Toilet Transfers  Toilet - Technique: Ambulating  Equipment Used: Standard toilet  Toilet Transfer: Supervision  ADL  Feeding: Independent  Grooming: Supervision  UE Bathing: Supervision  LE Bathing: Stand by assistance  UE Dressing: Supervision  LE Dressing: Stand by assistance  Toileting: Stand by assistance  Tone RUE  RUE Tone: Normotonic  Tone LUE  LUE Tone: Normotonic  Coordination  Movements Are Fluid And Coordinated: Yes     Bed mobility  Supine to Sit: Supervision  Scooting: Supervision  Transfers  Stand Step Transfers: Supervision  Sit to stand: Supervision  Stand to sit: Supervision     Cognition  Overall Cognitive Status: Exceptions  Arousal/Alertness: Appropriate responses to stimuli  Following Commands: Follows one step commands with repetition; Follows one step commands with increased time  Attention Span: Attends with cues to redirect; Difficulty dividing attention  Memory: Decreased short term memory;Decreased recall of recent events;Decreased recall of precautions;Decreased recall of biographical Information  Safety Judgement: Decreased awareness of need for assistance;Decreased awareness of need for safety  Problem Solving: Assistance required to generate solutions;Assistance required to implement solutions;Assistance required to identify errors made  Insights: Not aware of deficits  Initiation: Requires cues for some  Sequencing: Requires cues for some  Perception  Overall Perceptual Status: Impaired  Perseveration: Perseverates during conversation (very focused on grandma)     Sensation  Overall Sensation Status: WFL        LUE AROM (degrees)  LUE AROM : WFL  Left Hand AROM (degrees)  Left Hand AROM: WFL  RUE AROM (degrees)  RUE AROM : WFL  Right Hand AROM (degrees)  Right Hand AROM: WFL  LUE Strength  Gross LUE Strength: WFL  L Hand General: 5/5  RUE Strength  Gross RUE Strength: WFL  R Hand General: 5/5                   Plan   Plan  Times per week: 3-5x  Times per day: Daily  Current Treatment Recommendations: Cognitive Reorientation, Balance Training, Self-Care / ADL, Safety Education & Training, Endurance Training            AM-[facility] Inpatient Daily Activity Raw Score: 21 (06/04/21 1053)  AM-[facility] Inpatient ADL T-Scale Score : 44.27 (06/04/21 1053)  ADL Inpatient CMS 0-100% Score: 32.79 (06/04/21 1053)  ADL Inpatient CMS G-Code Modifier : Maylin Carter (06/04/21 1053)    Goals  Short term goals  Time Frame for Short term goals: d/c  Short term goal 1: Pt will complete LBD Mod I  Short term goal 2: Pt will complete toilet transfer Mod  I  Short term goal 3: Pt will be A/Ox4  Short term goal 4: Pt will complete toileting with Mod I  Long term goals  Time Frame for Long term goals : STGs=LTGs       Therapy Time   Individual Concurrent Group Co-treatment   Time In 0830         Time Out 0859         Minutes 29              Timed Code Treatment Minutes:  14 Minutes    Total Treatment Minutes:  [address] [facility]

## 2021-06-04 NOTE — PROGRESS NOTES
MRI questionnaire completed and faxed. Electronically signed by Mally Foster RN on 6/4/2021 at 1:39 PM

## 2021-06-04 NOTE — PROGRESS NOTES
Head to toe assessment complete. Pt oriented to name only. Vital signs obtained. Pt resting in bed at this time. AM medication administered. Pt tolerated well. Pt c/o pain 8/10 to left foot. Messaged MD regarding medication. Pt denies further needs at this time. Call light in hand. Pt verbalizes correct use. Bed alarm set. Will continue to monitor.  Electronically signed by Salina Mohan RN on 6/4/2021 at 8:51 AM

## 2021-06-04 NOTE — PLAN OF CARE
Discussed with neurology- memory impairment going on for 2 years. Oriented to person and place only; dont know time and address. confabulation noted during conversation. Await work up. Add MRI brain to r.o stroke , tumor or other etiologies. Cont ivf. Repeat lactic acid    Dc oral hypoglycemics. Start on low dose lantus and SSI. Await hba1c. May need placement.

## 2021-06-05 ENCOUNTER — APPOINTMENT (OUTPATIENT)
Dept: GENERAL RADIOLOGY | Age: 56
DRG: 042 | End: 2021-06-05
Payer: MEDICARE

## 2021-06-05 LAB
A/G RATIO: 1.3 (ref 1.1–2.2)
ALBUMIN SERPL-MCNC: 3.4 G/DL (ref 3.4–5)
ALP BLD-CCNC: 135 U/L (ref 40–129)
ALT SERPL-CCNC: 32 U/L (ref 10–40)
ANION GAP SERPL CALCULATED.3IONS-SCNC: 10 MMOL/L (ref 3–16)
AST SERPL-CCNC: 99 U/L (ref 15–37)
BASOPHILS ABSOLUTE: 0 K/UL (ref 0–0.2)
BASOPHILS RELATIVE PERCENT: 0.6 %
BILIRUB SERPL-MCNC: <0.2 MG/DL (ref 0–1)
BUN BLDV-MCNC: 20 MG/DL (ref 7–20)
CALCIUM SERPL-MCNC: 8.9 MG/DL (ref 8.3–10.6)
CHLORIDE BLD-SCNC: 102 MMOL/L (ref 99–110)
CO2: 24 MMOL/L (ref 21–32)
CREAT SERPL-MCNC: 0.8 MG/DL (ref 0.6–1.1)
EOSINOPHILS ABSOLUTE: 0.1 K/UL (ref 0–0.6)
EOSINOPHILS RELATIVE PERCENT: 1.5 %
GFR AFRICAN AMERICAN: >60
GFR NON-AFRICAN AMERICAN: >60
GLOBULIN: 2.7 G/DL
GLUCOSE BLD-MCNC: 167 MG/DL (ref 70–99)
GLUCOSE BLD-MCNC: 243 MG/DL (ref 70–99)
GLUCOSE BLD-MCNC: 267 MG/DL (ref 70–99)
GLUCOSE BLD-MCNC: 282 MG/DL (ref 70–99)
GLUCOSE BLD-MCNC: 308 MG/DL (ref 70–99)
GLUCOSE BLD-MCNC: 402 MG/DL (ref 70–99)
HCT VFR BLD CALC: 38.6 % (ref 36–48)
HEMOGLOBIN: 12.8 G/DL (ref 12–16)
LACTIC ACID: 1.9 MMOL/L (ref 0.4–2)
LYMPHOCYTES ABSOLUTE: 1.2 K/UL (ref 1–5.1)
LYMPHOCYTES RELATIVE PERCENT: 29.5 %
MCH RBC QN AUTO: 31.5 PG (ref 26–34)
MCHC RBC AUTO-ENTMCNC: 33.1 G/DL (ref 31–36)
MCV RBC AUTO: 95.1 FL (ref 80–100)
MONOCYTES ABSOLUTE: 0.4 K/UL (ref 0–1.3)
MONOCYTES RELATIVE PERCENT: 9.4 %
NEUTROPHILS ABSOLUTE: 2.4 K/UL (ref 1.7–7.7)
NEUTROPHILS RELATIVE PERCENT: 59 %
PDW BLD-RTO: 13.1 % (ref 12.4–15.4)
PERFORMED ON: ABNORMAL
PLATELET # BLD: 252 K/UL (ref 135–450)
PMV BLD AUTO: 7.4 FL (ref 5–10.5)
POTASSIUM REFLEX MAGNESIUM: 4.8 MMOL/L (ref 3.5–5.1)
RBC # BLD: 4.06 M/UL (ref 4–5.2)
SODIUM BLD-SCNC: 136 MMOL/L (ref 136–145)
TOTAL PROTEIN: 6.1 G/DL (ref 6.4–8.2)
URINE CULTURE, ROUTINE: NORMAL
WBC # BLD: 4 K/UL (ref 4–11)

## 2021-06-05 PROCEDURE — 6370000000 HC RX 637 (ALT 250 FOR IP): Performed by: INTERNAL MEDICINE

## 2021-06-05 PROCEDURE — 1200000000 HC SEMI PRIVATE

## 2021-06-05 PROCEDURE — 94760 N-INVAS EAR/PLS OXIMETRY 1: CPT

## 2021-06-05 PROCEDURE — 6360000002 HC RX W HCPCS: Performed by: INTERNAL MEDICINE

## 2021-06-05 PROCEDURE — 85025 COMPLETE CBC W/AUTO DIFF WBC: CPT

## 2021-06-05 PROCEDURE — 97530 THERAPEUTIC ACTIVITIES: CPT

## 2021-06-05 PROCEDURE — 36415 COLL VENOUS BLD VENIPUNCTURE: CPT

## 2021-06-05 PROCEDURE — 97535 SELF CARE MNGMENT TRAINING: CPT

## 2021-06-05 PROCEDURE — 93971 EXTREMITY STUDY: CPT

## 2021-06-05 PROCEDURE — 73552 X-RAY EXAM OF FEMUR 2/>: CPT

## 2021-06-05 PROCEDURE — 80053 COMPREHEN METABOLIC PANEL: CPT

## 2021-06-05 PROCEDURE — 83605 ASSAY OF LACTIC ACID: CPT

## 2021-06-05 PROCEDURE — 6370000000 HC RX 637 (ALT 250 FOR IP): Performed by: FAMILY MEDICINE

## 2021-06-05 RX ORDER — INSULIN LISPRO 100 [IU]/ML
0-18 INJECTION, SOLUTION INTRAVENOUS; SUBCUTANEOUS
Status: DISCONTINUED | OUTPATIENT
Start: 2021-06-05 | End: 2021-06-07

## 2021-06-05 RX ORDER — ACETAMINOPHEN 500 MG
1000 TABLET ORAL EVERY 8 HOURS
Status: DISCONTINUED | OUTPATIENT
Start: 2021-06-05 | End: 2021-06-08 | Stop reason: HOSPADM

## 2021-06-05 RX ORDER — INSULIN LISPRO 100 [IU]/ML
0-9 INJECTION, SOLUTION INTRAVENOUS; SUBCUTANEOUS NIGHTLY
Status: DISCONTINUED | OUTPATIENT
Start: 2021-06-05 | End: 2021-06-07

## 2021-06-05 RX ADMIN — ACETAMINOPHEN 1000 MG: 500 TABLET ORAL at 17:58

## 2021-06-05 RX ADMIN — GABAPENTIN 100 MG: 100 CAPSULE ORAL at 13:41

## 2021-06-05 RX ADMIN — INSULIN LISPRO 9 UNITS: 100 INJECTION, SOLUTION INTRAVENOUS; SUBCUTANEOUS at 17:55

## 2021-06-05 RX ADMIN — ACETAMINOPHEN 650 MG: 325 TABLET ORAL at 06:05

## 2021-06-05 RX ADMIN — GABAPENTIN 100 MG: 100 CAPSULE ORAL at 08:24

## 2021-06-05 RX ADMIN — FLUTICASONE PROPIONATE 1 SPRAY: 50 SPRAY, METERED NASAL at 08:26

## 2021-06-05 RX ADMIN — PANTOPRAZOLE SODIUM 40 MG: 40 TABLET, DELAYED RELEASE ORAL at 06:06

## 2021-06-05 RX ADMIN — ACETAMINOPHEN 650 MG: 325 TABLET ORAL at 10:00

## 2021-06-05 RX ADMIN — INSULIN LISPRO 2 UNITS: 100 INJECTION, SOLUTION INTRAVENOUS; SUBCUTANEOUS at 21:57

## 2021-06-05 RX ADMIN — INSULIN LISPRO 18 UNITS: 100 INJECTION, SOLUTION INTRAVENOUS; SUBCUTANEOUS at 12:17

## 2021-06-05 RX ADMIN — ASPIRIN 81 MG: 81 TABLET, COATED ORAL at 08:23

## 2021-06-05 RX ADMIN — ENOXAPARIN SODIUM 40 MG: 40 INJECTION SUBCUTANEOUS at 08:23

## 2021-06-05 RX ADMIN — LISINOPRIL 2.5 MG: 5 TABLET ORAL at 08:24

## 2021-06-05 RX ADMIN — INSULIN LISPRO 4 UNITS: 100 INJECTION, SOLUTION INTRAVENOUS; SUBCUTANEOUS at 08:26

## 2021-06-05 RX ADMIN — GABAPENTIN 100 MG: 100 CAPSULE ORAL at 21:56

## 2021-06-05 RX ADMIN — INSULIN GLARGINE 10 UNITS: 100 INJECTION, SOLUTION SUBCUTANEOUS at 08:26

## 2021-06-05 ASSESSMENT — PAIN SCALES - GENERAL
PAINLEVEL_OUTOF10: 8
PAINLEVEL_OUTOF10: 4
PAINLEVEL_OUTOF10: 9
PAINLEVEL_OUTOF10: 10
PAINLEVEL_OUTOF10: 7

## 2021-06-05 ASSESSMENT — PAIN SCALES - WONG BAKER
WONGBAKER_NUMERICALRESPONSE: 4
WONGBAKER_NUMERICALRESPONSE: 0
WONGBAKER_NUMERICALRESPONSE: 0
WONGBAKER_NUMERICALRESPONSE: 4
WONGBAKER_NUMERICALRESPONSE: 0
WONGBAKER_NUMERICALRESPONSE: 4
WONGBAKER_NUMERICALRESPONSE: 4

## 2021-06-05 ASSESSMENT — PAIN DESCRIPTION - DESCRIPTORS: DESCRIPTORS: BURNING

## 2021-06-05 ASSESSMENT — PAIN DESCRIPTION - PAIN TYPE: TYPE: CHRONIC PAIN

## 2021-06-05 ASSESSMENT — PAIN DESCRIPTION - LOCATION: LOCATION: KNEE

## 2021-06-05 NOTE — PROGRESS NOTES
Pt alert only to self and . BS was 63. Pt alert. Gave pt 4oz OJ, peanut butter and crackers. Pt drank a small amount of OJ and didn't want any more. Finished Crackers and peanut butter. BS then 64. Gave pt Apple juice. Will continue to monitor pts BS.

## 2021-06-05 NOTE — PROGRESS NOTES
Physical Therapy  Facility/Department: [address] ONCOLOGY  Daily Treatment Note  NAME: Nina Francis  : 1965  MRN: [id number]    Date of Service: 2021    Discharge Recommendations:  Nina Francis scored a 23/24 on the AM-PAC short mobility form. Current research shows that an AM-PAC score of 18 or greater is typically associated with a discharge to the patient's home setting. Based on the patient's AM-PAC score and their current functional mobility deficits, it is recommended that the patient have 2-3 sessions per week of Physical Therapy at d/c to increase the patient's independence. At this time, this patient demonstrates the endurance and safety to discharge home with home health PT and a follow up treatment frequency of 2-3x/wk. Please see assessment section for further patient specific details. If patient discharges prior to next session this note will serve as a discharge summary. Please see below for the latest assessment towards goals. HOME HEALTH CARE: LEVEL 3 SAFETY  - Initial home health evaluation to occur within 24-48 hours, in patient home   - Therapy evaluations in home within 24-48 hours of discharge; including DME and home safety   - Frontload therapy 5 days, then 3x a week   - Therapy to evaluate if patient has [address] needs for personal care   -  evaluation within 24-48 hours, includes evaluation of resources and insurance to determine [place], [place], LTC, and Medicaid options     Continue to assess pending progress   PT Equipment Recommendations  Equipment Needed: No    Assessment   Body structures, Functions, Activity limitations: Decreased balance;Decreased cognition;Decreased safe awareness  Assessment: Patient remains confused but mobilizing safely in room. Will continue to follow. Prognosis: Excellent  PT Education: Goals; General Safety;Gait Training;Orientation;Transfer Training;Functional Mobility Training  Patient Education: Pt will need reinforcement d/t cognitive status. Barriers to Learning: Cognition  REQUIRES PT FOLLOW UP: Yes  Activity Tolerance  Activity Tolerance: Patient Tolerated treatment well;Patient limited by cognitive status     Patient Diagnosis(es): The primary encounter diagnosis was Altered mental status, unspecified altered mental status type. Diagnoses of Essential hypertension and Type 2 diabetes mellitus without complication, without long-term current use of insulin ([facility] [address] were also pertinent to this visit. has a past medical history of Diabetes mellitus ([address] Hyperlipidemia, and Hypertension. has a past surgical history that includes Finger surgery and Cholecystectomy. Restrictions  Restrictions/Precautions  Restrictions/Precautions: Fall Risk (high fall risk  Simultaneous filing. User may not have seen previous data.)  Required Braces or Orthoses?: No (Simultaneous filing. User may not have seen previous data.)  Position Activity Restriction  Other position/activity restrictions: The patient is a 55 y.o. female with history of type 2 diabetes, hypertension, hyperlipidemia, cognitive impairment [place] [address] disoriented to place time and brought to the ER for further evaluation. (Simultaneous filing. User may not have seen previous data.)     Subjective   General  Chart Reviewed: Yes  Response To Previous Treatment: Not applicable  Family / Caregiver Present: No  Subjective  Subjective: Patient repeats a story of her and her sister trying to find her 9year old niece. General Comment  Comments: Patient's alarm going off, getting out of bed to bathroom on her own.   Pain Screening  Patient Currently in Pain: No  Vital Signs  Patient Currently in Pain: No       Orientation  Orientation  Overall Orientation Status: Impaired    Objective   Transfers  Sit to Stand: Modified independent  Stand to sit: Modified independent  Bed to Chair: Modified independent  Stand Pivot Transfers: Modified independent  Ambulation  Ambulation?: Yes  Ambulation 1  Surface: level tile  Device: No Device  Assistance: Supervision  Distance: 20'  Comments: Patient ambulating in room unassisted, appears steady. Stairs/Curb  Stairs?: No     Balance  Posture: Good  Sitting - Static: Good  Sitting - Dynamic: Good  Standing - Static: Good  Standing - Dynamic: Good  Comments: No evident balance deficits during today's session. AROM RLE (degrees)  RLE AROM: WFL  AROM LLE (degrees)  LLE AROM : WFL  Strength RLE  Strength RLE: WFL  Strength LLE  Strength LLE: [facility]         AM-PAC Score  AM-PAC Inpatient Mobility Raw Score : 23 (06/05/21 1426)  AM-PAC Inpatient T-Scale Score : 56.93 (06/05/21 1426)  Mobility Inpatient CMS 0-100% Score: 11.2 (06/05/21 1426)  Mobility Inpatient CMS G-Code Modifier : CI (06/05/21 1426)     Goals  Short term goals  Time Frame for Short term goals: To be met at discharge. Short term goal 1: Pt will perform transfers with [place]. (Goal met 6/5/2021)  Short term goal 2: Pt will maintain static/dynamic balance for 10 min with supervision. (Not met)  Short term goal 3: Pt will ambulate 350 ft with LRAD and mod I.  (Not met)  Short term goal 4: Pt will ascend/descend 14 steps with mod I.  (Not met)    Plan    Plan  Times per week: 1-2  Times per day: Daily  Current Treatment Recommendations: Strengthening, ROM, Balance Training, Endurance Training, Functional Mobility Training, Transfer Training, Gait Training, Stair training, Cognitive/Perceptual Training, Cognitive Reorientation  Safety Devices  Type of devices:  All fall risk precautions in place, Gait belt, Patient at risk for falls, Telesitter in use, Left in chair, Nurse notified, Left in bed, Call light within reach  Restraints  Initially in place: No     Therapy Time   Individual Concurrent Group Co-treatment   Time In 1400         Time Out 1423         Minutes 23         Timed Code Treatment Minutes: [address] [facility] [place], [place], [facility], [address] [place] [address]

## 2021-06-05 NOTE — PROGRESS NOTES
Occupational Therapy  Facility/Department: [address] ONCOLOGY  Daily Treatment Note  NAME: Leif Bennett  : 1965  MRN: [id number]    Date of Service: 2021    Discharge Recommendations:Ayleenmolly SAEID Clarisse Meneses scored a 21/24 on the AM-PAC ADL Inpatient form. Current research shows that an AM-PAC score of 18 or greater is typically associated with a discharge to the patient's home setting. Based on the patient's AM-PAC score, and their current ADL deficits, it is recommended that the patient have 2-3 sessions per week of Occupational Therapy at d/c to increase the patient's independence. At this time, this patient demonstrates the endurance and safety to discharge home with home health services and a follow up treatment frequency of 2-3x/wk. Please see assessment section for further patient specific details. If patient discharges prior to next session this note will serve as a discharge summary. Please see below for the latest assessment towards goals. HOME HEALTH CARE: LEVEL 3 SAFETY     - Initial home health evaluation to occur within 24-48 hours, in patient home   - Therapy evaluations in home within 24-48 hours of discharge; including DME and home safety   - Frontload therapy 5 days, then 3x a week   - Therapy to evaluate if patient has [address] needs for personal care   -  evaluation within 24-48 hours, includes evaluation of resources and insurance to determine AL, IL, LTC, and Medicaid options        Continue to assess pending progress       Assessment   Performance deficits / Impairments: Decreased safe awareness;Decreased balance;Decreased cognition  Assessment: Pt is a 55 y.o. F presenting with acute confusional state. Upon eval, pt is only minorly below functional baseline, but is most limited by pt's current cognitive state. If pt's cognitive state improves during admission pt approrpaite to d/c with 24 HR sup initally.  Will continue to assess based on pt's cognitive state during admission. Treatment Diagnosis: Acute confusional state  Prognosis: Good  OT Education: OT Role;Plan of Care;Orientation; ADL Adaptive Strategies  Patient Education: will require repititon  Barriers to Learning: cognition  REQUIRES OT FOLLOW UP: Yes  Activity Tolerance  Activity Tolerance: Patient Tolerated treatment well  Safety Devices  Safety Devices in place: Yes  Type of devices: Left in chair;Chair alarm in place; Patient at risk for falls;Call light within reach;Gait belt; All fall risk precautions in place;Nurse notified         Patient Diagnosis(es): The primary encounter diagnosis was Altered mental status, unspecified altered mental status type. Diagnoses of Essential hypertension and Type 2 diabetes mellitus without complication, without long-term current use of insulin ([facility] [address] were also pertinent to this visit. has a past medical history of Diabetes mellitus ([facility] [address] Hyperlipidemia, and Hypertension. has a past surgical history that includes Finger surgery and Cholecystectomy. Restrictions  Restrictions/Precautions  Restrictions/Precautions: Fall Risk (high fall risk)  Required Braces or Orthoses?: No   Position Activity Restriction  Other position/activity restrictions: The patient is a 55 y.o. female with history of type 2 diabetes, hypertension, hyperlipidemia, cognitive impairment who was found wandering the streets [place] [address] disoriented to place time and brought to the ER for further evaluation. Subjective    Subjective: Pt getting up to BR with alarm sounding. RN present with pt in BR on arrival.   General  Chart Reviewed: Yes  Patient assessed for rehabilitation services?: Yes  Family / Caregiver Present: No  Diagnosis: Acute Confusional State      Orientation  Orientation  Overall Orientation Status: Impaired  Orientation Level: Disoriented to person;Disoriented to place; Disoriented to time;Disoriented to situation  Objective    ADL  Grooming: Supervision  Toileting: Supervision  Additional Comments: Pt performed toileting and grooming at sink with no assistance. Supervision provided for safety d/t acute confusion. Balance  Sitting Balance: Independent  Standing Balance: Supervision  Functional Mobility  Functional - Mobility Device: No device  Activity: To/from bathroom; Other  Assist Level: Supervision  Toilet Transfers  Toilet - Technique: Ambulating  Equipment Used: Standard toilet  Toilet Transfer: Supervision  Bed mobility  Supine to Sit: Modified independent   Sit to Supine: Modified independent  Scooting: Modified independent   Transfers  Stand Step Transfers: Supervision  Sit to stand: Modified independent  Stand to sit: Modified independent        Vision  Patient Visual Report: No visual complaint reported. Cognition  Overall Cognitive Status: Exceptions  Arousal/Alertness: Appropriate responses to stimuli  Following Commands: Follows one step commands with repetition; Follows one step commands with increased time  Attention Span: Attends with cues to redirect; Difficulty dividing attention  Memory: Decreased short term memory;Decreased recall of recent events;Decreased recall of precautions;Decreased recall of biographical Information  Safety Judgement: Decreased awareness of need for assistance;Decreased awareness of need for safety  Problem Solving: Assistance required to generate solutions;Assistance required to implement solutions;Assistance required to identify errors made  Insights: Not aware of deficits  Initiation: Requires cues for some  Sequencing: Requires cues for some  Cognition Comment: Pt repeating self  telling story about looking for niece with sister.      Perception  Overall Perceptual Status: Impaired  Perseveration: Perseverates during conversation        Plan   Plan  Times per week: 3-5x  Times per day: Daily  Current Treatment Recommendations: Cognitive Reorientation, Balance Training, Self-Care / ADL, Safety Education & Training, Endurance Training    AM-PAC Score             Goals  Short term goals  Time Frame for Short term goals: d/c  Short term goal 1: Pt will complete LBD Mod I--not addressed  Short term goal 2: Pt will complete toilet transfer Mod  I--GOAL NOT MET 6/5  Short term goal 3: Pt will be A/Ox4--not addressed  Short term goal 4: Pt will complete toileting with Mod I--GOAL NOT MET 6/5  Long term goals  Time Frame for Long term goals : STGs=LTGs       Therapy Time   Individual Concurrent Group Co-treatment   Time In       1400   Time Out       1423   Minutes       23        Timed Code Treatment Minutes:  23 minutes    Total Treatment Minutes:  23 minutes      Samuel [address], [address], OTR/L 264557

## 2021-06-05 NOTE — PROGRESS NOTES
Hospital Medicine Progress Note     Date:  6/5/2021    PCP: Pascual Abraham MD (Tel: [phone])    Date of Admission: 6/3/2021      Subjective  Patient is very pleasant but is clearly confused, reviewing medical records appears she has history of dementia for unknown reason. Patient complaining of burning sensation of right lower extremity. Objective  Physical exam:  Vitals: /63   Pulse 85   Temp 97.9 °F (36.6 °C) (Oral)   Resp 18   Ht 5' 3\" (1.6 m)   Wt 124 lb 1.6 oz (56.3 kg)   SpO2 99%   BMI 21.98 kg/m²   Gen: Not in distress. Alert. Head: Normocephalic. Atraumatic. Eyes: EOMI. Good acuity. ENT: Oral mucosa moist  Neck: No JVD. No obvious thyromegaly. CVS: Nml S1S2, no MRG, RRR  Pulmomary: Clear bilaterally. No crackles. No wheezes. Gastrointestinal: Soft, NT/ND. Positive bowel sounds. Musculoskeletal: No edema. Warm  Neuro: Pleasantly confused, moving extremities spontaneously  Psychiatry: Appropriate affect. Not agitated. Skin: Warm, dry with normal turgor. No rash      24HR INTAKE/OUTPUT:      Intake/Output Summary (Last 24 hours) at 6/5/2021 1337  Last data filed at 6/5/2021 1126  Gross per 24 hour   Intake 480 ml   Output 550 ml   Net -70 ml     I/O last 3 completed shifts:   In: 480 [P.O.:480]  Out: 850 [Urine:850]  I/O this shift:  In: -   Out: 250 [Urine:250]      Meds:    acetaminophen  1,000 mg Oral Q8H    insulin glargine  12 Units Subcutaneous BID    insulin lispro  0-18 Units Subcutaneous TID WC    insulin lispro  0-9 Units Subcutaneous Nightly    aspirin  81 mg Oral Daily    fluticasone  1 spray Each Nostril Daily    lisinopril  2.5 mg Oral Daily    pantoprazole  40 mg Oral QAM AC    sodium chloride flush  5-40 mL Intravenous 2 times per day    enoxaparin  40 mg Subcutaneous Daily    gabapentin  100 mg Oral TID       Infusions:    sodium chloride      sodium chloride 75 mL/hr at 06/04/21 0410    dextrose           PRN Meds: sodium chloride flush, sodium chloride, ondansetron **OR** ondansetron, polyethylene glycol, glucose, dextrose, glucagon (rDNA), dextrose    Labs/imaging:  CBC:   Recent Labs     06/03/21  2340 06/04/21  0439 06/05/21  1006   WBC 9.3 6.9 4.0   HGB 14.4 12.9 12.8    274 252         BMP:    Recent Labs     06/03/21  2340 06/04/21  0439 06/05/21  1006    133* 136   K 4.5 4.0 4.8   CL 97* 97* 102   CO2 22 24 24   BUN 17 17 20   CREATININE 0.8 0.9 0.8   GLUCOSE 344* 570* 308*         Hepatic:   Recent Labs     06/03/21  2340 06/04/21  0439 06/05/21  1006   AST 69* 84* 99*   ALT 28 35 32   BILITOT 0.6 0.4 <0.2   ALKPHOS 139* 123 135*       Troponin:   Recent Labs     06/03/21 2340   TROPONINI <0.01         BNP: No results for input(s): BNP in the last 72 hours. INR:   Recent Labs     06/04/21  0422   INR 1.02           Reviewed imaging and reports noted      Assessment:  Principal Problem:    Acute confusional state  Active Problems:    Diabetes mellitus (HCC)    HTN (hypertension), benign    Mixed hyperlipidemia    Dementia (HCC)    Altered mental status    Uncontrolled type 2 diabetes mellitus with hyperglycemia ([facility] [address]    Dyslipidemia  Resolved Problems:    * No resolved hospital problems. *        Plan:  Acute confusional state  -Appreciate neurology recommendations  -Consult psychiatry   -Suspect this is her chronic state      Uncontrolled non-insulin-dependent diabetes mellitus type 2  -A1c 14.5  -Start Lantus 12 units twice daily, continue sliding scale insulin intensive, continue to monitor      Right lower extremity pain  -Right femur x-ray pending  -Venous Doppler right lower extremity negative for DVT  -Started on gabapentin and milligrams every 8 hours on 6/4/2021    GERD without esophagitis  -Continue PPI      History of dementia      Dispo:  We will await psychiatry evaluation but likely this is patient's chronic state, unfortunate situation where her and her 80year-old grandmother being evicted, skilled nursing facility versus ECF placement is underway      Diet: ADULT DIET;  Regular; 5 carb choices (75 gm/meal)    Activity: up with assist  Prophylaxis: lovenox    Code status: Full Code     ----------        Kellie Palomares MD  -------------------------------  Rounding hospitalist

## 2021-06-06 ENCOUNTER — APPOINTMENT (OUTPATIENT)
Dept: CT IMAGING | Age: 56
DRG: 042 | End: 2021-06-06
Payer: MEDICARE

## 2021-06-06 LAB
GLUCOSE BLD-MCNC: 243 MG/DL (ref 70–99)
GLUCOSE BLD-MCNC: 291 MG/DL (ref 70–99)
GLUCOSE BLD-MCNC: 313 MG/DL (ref 70–99)
GLUCOSE BLD-MCNC: 324 MG/DL (ref 70–99)
GLUCOSE BLD-MCNC: 391 MG/DL (ref 70–99)
PERFORMED ON: ABNORMAL

## 2021-06-06 PROCEDURE — 74176 CT ABD & PELVIS W/O CONTRAST: CPT

## 2021-06-06 PROCEDURE — 6370000000 HC RX 637 (ALT 250 FOR IP): Performed by: INTERNAL MEDICINE

## 2021-06-06 PROCEDURE — 6360000004 HC RX CONTRAST MEDICATION: Performed by: FAMILY MEDICINE

## 2021-06-06 PROCEDURE — 1200000000 HC SEMI PRIVATE

## 2021-06-06 PROCEDURE — 6360000002 HC RX W HCPCS: Performed by: INTERNAL MEDICINE

## 2021-06-06 PROCEDURE — 6370000000 HC RX 637 (ALT 250 FOR IP): Performed by: PHYSICIAN ASSISTANT

## 2021-06-06 PROCEDURE — 6370000000 HC RX 637 (ALT 250 FOR IP): Performed by: FAMILY MEDICINE

## 2021-06-06 RX ADMIN — ACETAMINOPHEN 1000 MG: 500 TABLET ORAL at 12:22

## 2021-06-06 RX ADMIN — INSULIN LISPRO 7 UNITS: 100 INJECTION, SOLUTION INTRAVENOUS; SUBCUTANEOUS at 20:16

## 2021-06-06 RX ADMIN — ENOXAPARIN SODIUM 40 MG: 40 INJECTION SUBCUTANEOUS at 08:09

## 2021-06-06 RX ADMIN — INSULIN LISPRO 12 UNITS: 100 INJECTION, SOLUTION INTRAVENOUS; SUBCUTANEOUS at 18:29

## 2021-06-06 RX ADMIN — ASPIRIN 81 MG: 81 TABLET, COATED ORAL at 08:08

## 2021-06-06 RX ADMIN — GABAPENTIN 100 MG: 100 CAPSULE ORAL at 12:23

## 2021-06-06 RX ADMIN — LISINOPRIL 2.5 MG: 5 TABLET ORAL at 08:08

## 2021-06-06 RX ADMIN — BENZOCAINE AND MENTHOL 1 LOZENGE: 15; 3.6 LOZENGE ORAL at 20:13

## 2021-06-06 RX ADMIN — GABAPENTIN 100 MG: 100 CAPSULE ORAL at 20:12

## 2021-06-06 RX ADMIN — GABAPENTIN 100 MG: 100 CAPSULE ORAL at 08:08

## 2021-06-06 RX ADMIN — IOHEXOL 50 ML: 240 INJECTION, SOLUTION INTRATHECAL; INTRAVASCULAR; INTRAVENOUS; ORAL at 14:19

## 2021-06-06 RX ADMIN — INSULIN LISPRO 12 UNITS: 100 INJECTION, SOLUTION INTRAVENOUS; SUBCUTANEOUS at 12:24

## 2021-06-06 RX ADMIN — PANTOPRAZOLE SODIUM 40 MG: 40 TABLET, DELAYED RELEASE ORAL at 07:03

## 2021-06-06 RX ADMIN — FLUTICASONE PROPIONATE 1 SPRAY: 50 SPRAY, METERED NASAL at 08:15

## 2021-06-06 RX ADMIN — BENZOCAINE AND MENTHOL 1 LOZENGE: 15; 3.6 LOZENGE ORAL at 00:48

## 2021-06-06 RX ADMIN — ACETAMINOPHEN 1000 MG: 500 TABLET ORAL at 18:29

## 2021-06-06 RX ADMIN — INSULIN LISPRO 9 UNITS: 100 INJECTION, SOLUTION INTRAVENOUS; SUBCUTANEOUS at 08:11

## 2021-06-06 RX ADMIN — ACETAMINOPHEN 1000 MG: 500 TABLET ORAL at 03:59

## 2021-06-06 ASSESSMENT — PAIN SCALES - GENERAL
PAINLEVEL_OUTOF10: 0
PAINLEVEL_OUTOF10: 0
PAINLEVEL_OUTOF10: 8
PAINLEVEL_OUTOF10: 6
PAINLEVEL_OUTOF10: 6
PAINLEVEL_OUTOF10: 0
PAINLEVEL_OUTOF10: 0
PAINLEVEL_OUTOF10: 10
PAINLEVEL_OUTOF10: 10

## 2021-06-06 ASSESSMENT — PAIN DESCRIPTION - LOCATION: LOCATION: KNEE

## 2021-06-06 ASSESSMENT — PAIN SCALES - WONG BAKER
WONGBAKER_NUMERICALRESPONSE: 4
WONGBAKER_NUMERICALRESPONSE: 4
WONGBAKER_NUMERICALRESPONSE: 0
WONGBAKER_NUMERICALRESPONSE: 4

## 2021-06-06 ASSESSMENT — PAIN DESCRIPTION - PAIN TYPE: TYPE: CHRONIC PAIN

## 2021-06-06 NOTE — PROGRESS NOTES
Hospital Medicine Progress Note     Date:  6/6/2021    PCP: Kapil Gan MD (Tel: [phone])    Date of Admission: 6/3/2021      Subjective  No acute complaints, patient sister is at the bedside. Objective  Physical exam:  Vitals: /72   Pulse 79   Temp 98.2 °F (36.8 °C) (Oral)   Resp 18   Ht 5' 3\" (1.6 m)   Wt 124 lb 1.6 oz (56.3 kg)   SpO2 100%   BMI 21.98 kg/m²   Gen: Not in distress. Alert. Head: Normocephalic. Atraumatic. Eyes: EOMI. Good acuity. ENT: Oral mucosa moist  Neck: No JVD. No obvious thyromegaly. CVS: Nml S1S2, no MRG, RRR  Pulmomary: Clear bilaterally. No crackles. No wheezes. Gastrointestinal: Soft, NT/ND. Positive bowel sounds. Musculoskeletal: No edema. Warm  Neuro: Pleasantly confused, moving extremities spontaneously  Psychiatry: Appropriate affect. Not agitated. Skin: Warm, dry with normal turgor. No rash      24HR INTAKE/OUTPUT:      Intake/Output Summary (Last 24 hours) at 6/6/2021 1422  Last data filed at 6/5/2021 1708  Gross per 24 hour   Intake 720 ml   Output --   Net 720 ml     I/O last 3 completed shifts: In: 2160 [P.O.:2160]  Out: 250 [Urine:250]  No intake/output data recorded.       Meds:    insulin glargine  16 Units Subcutaneous BID    acetaminophen  1,000 mg Oral Q8H    insulin lispro  0-18 Units Subcutaneous TID WC    insulin lispro  0-9 Units Subcutaneous Nightly    aspirin  81 mg Oral Daily    fluticasone  1 spray Each Nostril Daily    lisinopril  2.5 mg Oral Daily    pantoprazole  40 mg Oral QAM AC    sodium chloride flush  5-40 mL Intravenous 2 times per day    enoxaparin  40 mg Subcutaneous Daily    gabapentin  100 mg Oral TID       Infusions:    sodium chloride      dextrose           PRN Meds: benzocaine-menthol, sodium chloride flush, sodium chloride, ondansetron **OR** ondansetron, polyethylene glycol, glucose, dextrose, glucagon (rDNA), dextrose    Labs/imaging:  CBC:   Recent Labs     06/03/21  2340 06/04/21  9373 06/05/21  1006   WBC 9.3 6.9 4.0   HGB 14.4 12.9 12.8    274 252         BMP:    Recent Labs     06/03/21  2340 06/04/21  0439 06/05/21  1006    133* 136   K 4.5 4.0 4.8   CL 97* 97* 102   CO2 22 24 24   BUN 17 17 20   CREATININE 0.8 0.9 0.8   GLUCOSE 344* 570* 308*         Hepatic:   Recent Labs     06/03/21  2340 06/04/21  0439 06/05/21  1006   AST 69* 84* 99*   ALT 28 35 32   BILITOT 0.6 0.4 <0.2   ALKPHOS 139* 123 135*       Troponin:   Recent Labs     06/03/21 2340   TROPONINI <0.01         BNP: No results for input(s): BNP in the last 72 hours. INR:   Recent Labs     06/04/21 0422   INR 1.02           Reviewed imaging and reports noted      Assessment:  Principal Problem:    Acute confusional state  Active Problems:    Diabetes mellitus (HCC)    HTN (hypertension), benign    Mixed hyperlipidemia    Dementia (HCC)    Altered mental status    Uncontrolled type 2 diabetes mellitus with hyperglycemia ([facility] [address]    Dyslipidemia  Resolved Problems:    * No resolved hospital problems.  *        Plan:  Acute confusional state  -Appreciate neurology recommendations  -Consult psychiatry   -Suspect this is her chronic state      Uncontrolled non-insulin-dependent diabetes mellitus type 2  -A1c 14.5  -Blood sugars variable  -increase Lantus to 16 units twice daily, continue sliding scale insulin intensive, continue to monitor      Right lower extremity pain  -Right femur x-ray negative for anything acute  -Venous Doppler right lower extremity negative for DVT  -Cont gabapentin 100 milligrams every 8 hours on 6/4/2021    GERD without esophagitis  -Continue PPI      History of dementia  -Recommend following up at [facility] memory clinic outpatient       I discussed with patient's sister [de-identified] [place] at the bedside and she can be reached at [phone] stated that patient has been slowly progressively declining over the past 3 years, 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

## 2021-06-06 NOTE — CARE COORDINATION
CM spoke to pt's sister [de-identified] Kim [phone] earlier this afternoon when she was visiting her grandmother here and was unaware her sister was also admitted her. CM took her to pt's room and pt looked at her and said \" I know you. \" but could not recall her sister's name. Seven states her sister's cognitive abilities have been decreasing over the past three years and her wandering has become worse. She states she has noted when visiting pt in the home that she would fixate on something and then be repetitive about it such as \" your shirt is blue. \" She also states she hides stuff like silverware. And also states pt has lost a significant amount of weight and use to be closer to 300 lbs. CM discussed insurance and barriers with sister. Sister states pt was laid off 3 years ago and has no income. CM provided information for contacting Aurora Morales in financial services tomorrow to see if pt can qualify for medicaid. CM discussed d/c process and barriers. Sister verbalized understanding. CM told sister will make referral to [facility] to see if we can place pt and her grandmother together at same facility. She states that there is her and cousins but no one is able to care for them at home. Dr Kauffman Ny on floor and spoke with granddaughter also. CM made right fax referral to [address] [place] [address] and called and spoke to Stacy Vincent [phone] in admissions and she will review patient and her grandmother to see if they can accept. CM left  anahy Vaughan in financial services that sister would be contacting her. Demographics updated with sister's phone number. CM will continue to follow for d/c plan.     Khushbu Antonio RN, BSN  [phone]

## 2021-06-07 LAB
GLUCOSE BLD-MCNC: 125 MG/DL (ref 70–99)
GLUCOSE BLD-MCNC: 176 MG/DL (ref 70–99)
GLUCOSE BLD-MCNC: 188 MG/DL (ref 70–99)
GLUCOSE BLD-MCNC: 268 MG/DL (ref 70–99)
GLUCOSE BLD-MCNC: 290 MG/DL (ref 70–99)
GLUCOSE BLD-MCNC: 335 MG/DL (ref 70–99)
PERFORMED ON: ABNORMAL
VITAMIN B1, PLASMA: 5 NMOL/L (ref 4–15)

## 2021-06-07 PROCEDURE — 6370000000 HC RX 637 (ALT 250 FOR IP): Performed by: INTERNAL MEDICINE

## 2021-06-07 PROCEDURE — 6370000000 HC RX 637 (ALT 250 FOR IP): Performed by: PHYSICIAN ASSISTANT

## 2021-06-07 PROCEDURE — 97530 THERAPEUTIC ACTIVITIES: CPT

## 2021-06-07 PROCEDURE — 6370000000 HC RX 637 (ALT 250 FOR IP): Performed by: FAMILY MEDICINE

## 2021-06-07 PROCEDURE — 1200000000 HC SEMI PRIVATE

## 2021-06-07 PROCEDURE — 6360000002 HC RX W HCPCS: Performed by: INTERNAL MEDICINE

## 2021-06-07 RX ORDER — INSULIN LISPRO 100 [IU]/ML
0-6 INJECTION, SOLUTION INTRAVENOUS; SUBCUTANEOUS NIGHTLY
Status: DISCONTINUED | OUTPATIENT
Start: 2021-06-07 | End: 2021-06-08 | Stop reason: HOSPADM

## 2021-06-07 RX ORDER — RISPERIDONE 0.25 MG/1
0.25 TABLET, FILM COATED ORAL NIGHTLY
Status: DISCONTINUED | OUTPATIENT
Start: 2021-06-07 | End: 2021-06-08 | Stop reason: HOSPADM

## 2021-06-07 RX ORDER — PANTOPRAZOLE SODIUM 40 MG/1
40 TABLET, DELAYED RELEASE ORAL
Qty: 15 TABLET | Refills: 0 | Status: SHIPPED | OUTPATIENT
Start: 2021-06-08

## 2021-06-07 RX ORDER — INSULIN LISPRO 100 [IU]/ML
0-12 INJECTION, SOLUTION INTRAVENOUS; SUBCUTANEOUS
Status: DISCONTINUED | OUTPATIENT
Start: 2021-06-07 | End: 2021-06-08 | Stop reason: HOSPADM

## 2021-06-07 RX ORDER — GABAPENTIN 100 MG/1
100 CAPSULE ORAL 3 TIMES DAILY
Qty: 90 CAPSULE | Refills: 0 | Status: SHIPPED | OUTPATIENT
Start: 2021-06-07 | End: 2021-07-07

## 2021-06-07 RX ORDER — LISINOPRIL 2.5 MG/1
2.5 TABLET ORAL DAILY
Qty: 30 TABLET | Refills: 0 | Status: SHIPPED | OUTPATIENT
Start: 2021-06-08

## 2021-06-07 RX ORDER — ASPIRIN 81 MG/1
81 TABLET ORAL DAILY
Qty: 30 TABLET | Refills: 0 | Status: SHIPPED | OUTPATIENT
Start: 2021-06-08

## 2021-06-07 RX ORDER — CALCIUM CARBONATE 200(500)MG
500 TABLET,CHEWABLE ORAL 3 TIMES DAILY PRN
Status: DISCONTINUED | OUTPATIENT
Start: 2021-06-07 | End: 2021-06-08 | Stop reason: HOSPADM

## 2021-06-07 RX ORDER — INSULIN LISPRO 100 [IU]/ML
0-12 INJECTION, SOLUTION INTRAVENOUS; SUBCUTANEOUS
Qty: 10.8 ML | Refills: 0 | Status: SHIPPED | OUTPATIENT
Start: 2021-06-07 | End: 2021-07-07

## 2021-06-07 RX ADMIN — GABAPENTIN 100 MG: 100 CAPSULE ORAL at 13:35

## 2021-06-07 RX ADMIN — ACETAMINOPHEN 1000 MG: 500 TABLET ORAL at 09:48

## 2021-06-07 RX ADMIN — LISINOPRIL 2.5 MG: 5 TABLET ORAL at 09:49

## 2021-06-07 RX ADMIN — RISPERIDONE 0.25 MG: 0.25 TABLET ORAL at 20:56

## 2021-06-07 RX ADMIN — BENZOCAINE AND MENTHOL 1 LOZENGE: 15; 3.6 LOZENGE ORAL at 00:28

## 2021-06-07 RX ADMIN — ASPIRIN 81 MG: 81 TABLET, COATED ORAL at 09:47

## 2021-06-07 RX ADMIN — INSULIN LISPRO 6 UNITS: 100 INJECTION, SOLUTION INTRAVENOUS; SUBCUTANEOUS at 12:25

## 2021-06-07 RX ADMIN — INSULIN LISPRO 4 UNITS: 100 INJECTION, SOLUTION INTRAVENOUS; SUBCUTANEOUS at 20:57

## 2021-06-07 RX ADMIN — INSULIN LISPRO 6 UNITS: 100 INJECTION, SOLUTION INTRAVENOUS; SUBCUTANEOUS at 18:46

## 2021-06-07 RX ADMIN — GABAPENTIN 100 MG: 100 CAPSULE ORAL at 09:49

## 2021-06-07 RX ADMIN — ENOXAPARIN SODIUM 40 MG: 40 INJECTION SUBCUTANEOUS at 09:50

## 2021-06-07 RX ADMIN — ACETAMINOPHEN 1000 MG: 500 TABLET ORAL at 18:45

## 2021-06-07 RX ADMIN — ANTACID TABLETS 500 MG: 500 TABLET, CHEWABLE ORAL at 12:28

## 2021-06-07 RX ADMIN — ACETAMINOPHEN 1000 MG: 500 TABLET ORAL at 02:57

## 2021-06-07 RX ADMIN — BENZOCAINE AND MENTHOL 1 LOZENGE: 15; 3.6 LOZENGE ORAL at 09:47

## 2021-06-07 RX ADMIN — PANTOPRAZOLE SODIUM 40 MG: 40 TABLET, DELAYED RELEASE ORAL at 05:39

## 2021-06-07 RX ADMIN — GABAPENTIN 100 MG: 100 CAPSULE ORAL at 20:56

## 2021-06-07 RX ADMIN — Medication 1 SPRAY: at 12:28

## 2021-06-07 RX ADMIN — FLUTICASONE PROPIONATE 1 SPRAY: 50 SPRAY, METERED NASAL at 09:51

## 2021-06-07 ASSESSMENT — PAIN SCALES - GENERAL
PAINLEVEL_OUTOF10: 0
PAINLEVEL_OUTOF10: 3
PAINLEVEL_OUTOF10: 5
PAINLEVEL_OUTOF10: 5
PAINLEVEL_OUTOF10: 4
PAINLEVEL_OUTOF10: 0
PAINLEVEL_OUTOF10: 5
PAINLEVEL_OUTOF10: 0

## 2021-06-07 ASSESSMENT — PAIN DESCRIPTION - PAIN TYPE
TYPE: ACUTE PAIN
TYPE: ACUTE PAIN
TYPE: CHRONIC PAIN

## 2021-06-07 ASSESSMENT — PAIN DESCRIPTION - LOCATION
LOCATION: THROAT
LOCATION: THROAT
LOCATION: KNEE

## 2021-06-07 ASSESSMENT — PAIN DESCRIPTION - ORIENTATION: ORIENTATION: LEFT

## 2021-06-07 NOTE — PLAN OF CARE
Problem: Falls - Risk of:  Goal: Will remain free from falls  Description: Will remain free from falls  Outcome: Ongoing  Note: Fall precautions in place: bed locked and in lowest position, bed alarm on, 2/4 side rails raised, non-slip socks on, call light and overhead table within reach, patient knows when to appropriately call out for help.      Goal: Absence of physical injury  Description: Absence of physical injury  Outcome: Ongoing     Problem: Discharge Planning:  Goal: Discharged to appropriate level of care  Description: Discharged to appropriate level of care  Outcome: Ongoing     Problem: Serum Glucose Level - Abnormal:  Goal: Ability to maintain appropriate glucose levels will improve  Description: Ability to maintain appropriate glucose levels will improve  Outcome: Ongoing     Problem: Sensory Perception - Impaired:  Goal: Ability to maintain a stable neurologic state will improve  Description: Ability to maintain a stable neurologic state will improve  Outcome: Ongoing     Problem: Musculor/Skeletal Functional Status  Goal: Highest potential functional level  Outcome: Ongoing  Goal: Absence of falls  Outcome: Ongoing     Problem: Pain:  Goal: Pain level will decrease  Description: Pain level will decrease  Outcome: Ongoing  Goal: Control of acute pain  Description: Control of acute pain  Outcome: Ongoing  Goal: Control of chronic pain  Description: Control of chronic pain  Outcome: Ongoing

## 2021-06-07 NOTE — DISCHARGE INSTR - COC
Continuity of Care Form    Patient Name: Filomena Carrizales   :  1965  MRN:  [id number]    Admit date:  6/3/2021  Discharge date:  2021    Code Status Order: Full Code   Advance Directives:   [facility] Documentation       Date/Time Healthcare Directive Type of Healthcare Directive Copy in [address] Agent's Name Healthcare Agent's Phone Number    21  Unknown, patient unable to respond due to medical condition -- -- -- -- --            Admitting Physician:  Tabby Monroy MD  PCP: Lizy Glez MD    Discharging Nurse: [facility] Unit/Room#: 5BO-9062/1800-59  Discharging Unit Phone Number: [phone]    Emergency Contact:   Extended Emergency Contact Information  Primary Emergency Contact: Carol Hoover  Address: [address] [place] [address], [address] [facility]  Home Phone: [phone]  Relation: Grandparent  Secondary Emergency Contact: joselyn Box  Address: [address] [facility], [address] [place] [address] Ty Matson [address] Phone: [phone]  Mobile Phone: [phone]  Relation: Brother/Sister    Past Surgical History:  Past Surgical History:   Procedure Laterality Date    CHOLECYSTECTOMY      FINGER SURGERY         Immunization History: There is no immunization history on file for this patient.     Active Problems:  Patient Active Problem List   Diagnosis Code    Abnormal EKG R94.31    Calculus of gallbladder with acute cholecystitis K80.00    Diabetes mellitus ([address] E11.9    Dizziness and giddiness R42    HTN (hypertension), benign I10    Mixed hyperlipidemia E78.2    Shortness of breath R06.02    Disequilibrium R42    Burning sensation of throat R07.0    Burning tongue K14.6    Acute confusional state F05    Dementia (HCC) F03.90    Altered mental status R41.82    Uncontrolled type 2 diabetes mellitus with hyperglycemia (HCC) E11.65    Dyslipidemia E78.5       Isolation/Infection: 6/4/2021    Readmission Risk Assessment Score:  Readmission Risk              Risk of Unplanned Readmission:  11           Discharging to Facility/ Agency   · Name: Ayo Purdy at [facility]   · [address] [facility], [address] [facility]  · Phone:[phone]  · [address] [facility]    Dialysis Facility (if applicable)   · Name:  · Address:  · Dialysis Schedule:  · Phone:  · Fax:    / signature: Oswaldo Tian RN, BSN  [phone]       PHYSICIAN SECTION    Prognosis: Fair    Condition at Discharge: Stable    Rehab Potential (if transferring to Rehab): Fair    Recommended Labs or Other Treatments After Discharge: F/U w/ PCP in 1 week    Physician Certification: I certify the above information and transfer of Marbella Reynoso  is necessary for the continuing treatment of the diagnosis listed and that she requires [facility] for less 30 days.      Update Admission H&P: No change in H&P    PHYSICIAN SIGNATURE:  Electronically signed by Rhett Roper MD on 6/7/21 at 12:11 PM EDT

## 2021-06-07 NOTE — PROGRESS NOTES
static/dynamic balance for 10 min with supervision. met  Short term goal 3: Pt will ambulate 350 ft with LRAD and mod I.  met  Short term goal 4: Pt will ascend/descend 14 steps with mod I. met    Plan    Plan  Times per week: 1-2  Times per day: Daily  Current Treatment Recommendations: Strengthening, ROM, Balance Training, Endurance Training, Functional Mobility Training, Transfer Training, Gait Training, Stair training, Cognitive/Perceptual Training, Cognitive Reorientation  Safety Devices  Type of devices:  All fall risk precautions in place, Gait belt, Patient at risk for falls, Telesitter in use, Left in chair, Nurse notified, Call light within reach, Chair alarm in place  Restraints  Initially in place: No     Therapy Time   Individual Concurrent Group Co-treatment   Time In 1141         Time Out 1155         Minutes 14         Timed Code Treatment Minutes: [id number]

## 2021-06-07 NOTE — PROGRESS NOTES
Hospital Medicine Progress Note     Date:  6/7/2021    PCP: Daralyn Osgood, MD (Tel: [phone])    Date of Admission: 6/3/2021      Subjective  Patient denies any acute complaints. Objective  Physical exam:  Vitals: /83   Pulse 88   Temp 98.3 °F (36.8 °C) (Oral)   Resp 16   Ht 5' 3\" (1.6 m)   Wt 124 lb 1.6 oz (56.3 kg)   SpO2 100%   BMI 21.98 kg/m²   Gen: Not in distress. Alert. Head: Normocephalic. Atraumatic. Eyes: EOMI. Good acuity. ENT: Oral mucosa moist  Neck: No JVD. No obvious thyromegaly. CVS: Nml S1S2, no MRG, RRR  Pulmomary: Clear bilaterally. No crackles. No wheezes. Gastrointestinal: Soft, NT/ND. Positive bowel sounds. Musculoskeletal: No edema. Warm  Neuro: Pleasantly confused, moving extremities spontaneously  Psychiatry: Appropriate affect. Not agitated. Skin: Warm, dry with normal turgor. No rash      24HR INTAKE/OUTPUT:    No intake or output data in the 24 hours ending 06/07/21 1601  No intake/output data recorded. No intake/output data recorded.       Meds:    insulin lispro  0-12 Units Subcutaneous TID WC    insulin lispro  0-6 Units Subcutaneous Nightly    risperiDONE  0.25 mg Oral Nightly    insulin glargine  16 Units Subcutaneous BID    acetaminophen  1,000 mg Oral Q8H    aspirin  81 mg Oral Daily    fluticasone  1 spray Each Nostril Daily    lisinopril  2.5 mg Oral Daily    pantoprazole  40 mg Oral QAM AC    sodium chloride flush  5-40 mL Intravenous 2 times per day    enoxaparin  40 mg Subcutaneous Daily    gabapentin  100 mg Oral TID       Infusions:    sodium chloride      dextrose           PRN Meds: phenol, calcium carbonate, benzocaine-menthol, sodium chloride flush, sodium chloride, ondansetron **OR** ondansetron, polyethylene glycol, glucose, dextrose, glucagon (rDNA), dextrose    Labs/imaging:  CBC:   Recent Labs     06/05/21  1006   WBC 4.0   HGB 12.8            BMP:    Recent Labs     06/05/21  1006      K 4.8

## 2021-06-07 NOTE — CARE COORDINATION
Accepted to [facility] at [facility] per Velma Fleming. Medicaid pending in process. Becky to contact [place] in financial to sort out details and hopeful that pt can admit to facility tomorrow. Pt's sister, Seven notified and happy with this plan.     Ezequiel Abreu MSW, [address]

## 2021-06-07 NOTE — CARE COORDINATION
EVERARDO spoke with financial  who report pt should be medicaid elieble and should have a medicaid number today vs tomorrow. EVERARDO left VM for Latonia at [facility] regarding placement.     Christophe Maldonado MSW, [address]

## 2021-06-07 NOTE — CONSULTS
[facility]                     [address] [facility], [address]                                  CONSULTATION    PATIENT NAME: Lamonte Barnes                  :        1965  MED REC NO:   [id number]                          ROOM:       0252  ACCOUNT NO:   [de-identified]                           ADMIT DATE: 2021  PROVIDER:     Sandy Garland MD    CONSULT DATE:  2021    REASON FOR CONSULTATION:  The patient is a 50-year-old   female who is now presenting with altered mental status. CHIEF COMPLAINT AND HISTORY OF PRESENT ILLNESS:  The patient was  referred for hospitalization after having disorganized thinking, anxiety  symptoms, tangentiality and ideas of reference. The patient was noted  to have difficulty with word salad and was noted to be increasingly  unpredictable. The patient was noted to be having some challenges to  her activities of daily living and confusion in her orientation. PAST PSYCHIATRIC HISTORY:  The patient states that she has had previous  treatment for depression, anxiety and has had variable compliance. MENTAL STATUS EXAMINATION:  The patient is a 50-year-old [place]  American female. She is dressed in a hospital gown. She is alert and  oriented to person and place. She is unaware of the date and time. Her  sensorium is clouded. Her affect is labile and intense. Her state of  mood is \"I'm really confused. \"  She denies any suicidality or  homicidality. She is noted to have auditory and visual hallucinations  and is paranoid suspicious or others. Thought process is illogical,  tangential at times. Thought content is focused on her mood and  behavioral difficulties. Her insight is poor, judgment is poor, impulse  control is poor. Speech pressured. Language inappropriate. General  fund of knowledge adequate. Association is loose. Attention span  inattentive.   Memory, long-term memory impaired, short-term memory  impaired. Method of evaluation, long-term memory with date of birth,  short-term memory with three objects with repetition. DIAGNOSES:  Delirium rule out major depressive disorder, severe,  recurrent with psychotic features. TREATMENT RECOMMENDATIONS:  It is unclear whether her acute mental  status is related to metabolic condition versus mental health. The  patient is endorsing ongoing challenges with depression and anxiety  and/or confusion may be related to an underlying mood disorder. Treating her at this time with Risperdal 0.5 mg twice a day may be  helpful to stabilize her mood and evaluating whether this is related to  her long-term mood disorder may be considered and referral to an  outpatient provider may be beneficial upon discharge. Thank you for consulting me on this interesting patient.         Miguelina Hernandez MD    D: 06/07/2021 11:03:56       T: 06/07/2021 12:25:31     [email or web address]  Job#: [id number]     Doc#: [id number]    CC:

## 2021-06-07 NOTE — CARE COORDINATION
SW spoke with Sylvia Naranjo at [facility] who reports they do not have secure unit for pt. SW spoke with pt's sister, [de-identified], who confirms taht pt has been known to wander and that's why she ends up in the hospital.  States she needs secured unit. Sister lives in [facility] and would like for pt to stay in this area. Multiple referrals sent.     Stefania Sandoval MSW, [address]

## 2021-06-07 NOTE — PROGRESS NOTES
Message sent to on-call hospitalist about continuous sitter order: Pt admitted on Friday 6/4 due to confusion. Sitter at bedside order was placed. Patient is more alert now. Camera has been placed in room. Can you discontinue sitter order? Thanks. 0149 Order discontinued.

## 2021-06-08 VITALS
HEART RATE: 87 BPM | HEIGHT: 63 IN | TEMPERATURE: 97.6 F | SYSTOLIC BLOOD PRESSURE: 127 MMHG | BODY MASS INDEX: 21.99 KG/M2 | WEIGHT: 124.1 LBS | RESPIRATION RATE: 16 BRPM | DIASTOLIC BLOOD PRESSURE: 80 MMHG | OXYGEN SATURATION: 100 %

## 2021-06-08 LAB
GLUCOSE BLD-MCNC: 126 MG/DL (ref 70–99)
GLUCOSE BLD-MCNC: 261 MG/DL (ref 70–99)
GLUCOSE BLD-MCNC: 344 MG/DL (ref 70–99)
PERFORMED ON: ABNORMAL
SARS-COV-2, NAAT: NOT DETECTED

## 2021-06-08 PROCEDURE — 6360000002 HC RX W HCPCS: Performed by: INTERNAL MEDICINE

## 2021-06-08 PROCEDURE — 87635 SARS-COV-2 COVID-19 AMP PRB: CPT

## 2021-06-08 PROCEDURE — 6370000000 HC RX 637 (ALT 250 FOR IP): Performed by: INTERNAL MEDICINE

## 2021-06-08 PROCEDURE — 6370000000 HC RX 637 (ALT 250 FOR IP): Performed by: FAMILY MEDICINE

## 2021-06-08 RX ORDER — RISPERIDONE 0.25 MG/1
0.25 TABLET, FILM COATED ORAL NIGHTLY
Qty: 60 TABLET | Refills: 3 | Status: SHIPPED | OUTPATIENT
Start: 2021-06-08

## 2021-06-08 RX ADMIN — Medication 1 SPRAY: at 10:22

## 2021-06-08 RX ADMIN — GABAPENTIN 100 MG: 100 CAPSULE ORAL at 10:22

## 2021-06-08 RX ADMIN — INSULIN LISPRO 8 UNITS: 100 INJECTION, SOLUTION INTRAVENOUS; SUBCUTANEOUS at 14:06

## 2021-06-08 RX ADMIN — FLUTICASONE PROPIONATE 1 SPRAY: 50 SPRAY, METERED NASAL at 10:23

## 2021-06-08 RX ADMIN — LISINOPRIL 2.5 MG: 5 TABLET ORAL at 10:20

## 2021-06-08 RX ADMIN — ENOXAPARIN SODIUM 40 MG: 40 INJECTION SUBCUTANEOUS at 10:22

## 2021-06-08 RX ADMIN — ASPIRIN 81 MG: 81 TABLET, COATED ORAL at 10:21

## 2021-06-08 RX ADMIN — GABAPENTIN 100 MG: 100 CAPSULE ORAL at 14:09

## 2021-06-08 RX ADMIN — PANTOPRAZOLE SODIUM 40 MG: 40 TABLET, DELAYED RELEASE ORAL at 05:16

## 2021-06-08 RX ADMIN — ACETAMINOPHEN 1000 MG: 500 TABLET ORAL at 10:20

## 2021-06-08 RX ADMIN — ANTACID TABLETS 500 MG: 500 TABLET, CHEWABLE ORAL at 10:20

## 2021-06-08 RX ADMIN — ACETAMINOPHEN 1000 MG: 500 TABLET ORAL at 04:19

## 2021-06-08 ASSESSMENT — PAIN SCALES - GENERAL
PAINLEVEL_OUTOF10: 0

## 2021-06-08 NOTE — DISCHARGE SUMMARY
Hospital Medicine Discharge Summary    Patient: Reine Goldberg     Gender: female  : 1965   Age: 55 y.o. MRN: [id number]    Admitting Physician: Susana Lockwood MD  Discharge Physician: Anneliese Hinton MD     Code Status: Full Code     Admit Date: 6/3/2021   Discharge Date:   21    Disposition:  SNF  Time spent arranging discharge: 35 minutes    Discharge Diagnoses: Active Hospital Problems    Diagnosis Date Noted    Acute confusional state [F05] 2021    Dementia ([facility] [address] [F03.90] 2021    Altered mental status [R41.82]     Uncontrolled type 2 diabetes mellitus with hyperglycemia ([address] [E11.65]     Dyslipidemia [E78.5]     Diabetes mellitus ([facility] [address] [E11.9] 2019    Mixed hyperlipidemia [E78.2] 08/10/2017    HTN (hypertension), benign [I10] 08/10/2017           Condition at Discharge:  [address] Course:   Patient was admitted to hospital acute confusional state. Patient MRI brain done which was negative. Per neurology and psychiatry this is likely acute on chronic dementia and recommend further outpatient work-up. Patient was started on Risperdal 0.25 nightly per psychiatry. Patient did have right lower extremity pain likely neuropathic started on gabapentin. DVT ultrasound was negative. Patient was discharged to skilled nursing facility for further work-up. Will need outpatient neurology follow-up along with psychiatry follow-up    Discharge Exam:    /82   Pulse 88   Temp 98.1 °F (36.7 °C) (Oral)   Resp 16   Ht 5' 3\" (1.6 m)   Wt 124 lb 1.6 oz (56.3 kg)   SpO2 100%   BMI 21.98 kg/m²   General appearance:  Appears comfortable. AAOx3  HEENT: atraumatic, Pupils equal, muscous membranes moist, no masses appreciated  Cardiovascular: Regular rate and rhythm no murmurs appreciated  Respiratory: CTAB no wheezing  Gastrointestinal: Abdomen soft, non-tender, BS+  EXT: no edema  Neurology: no gross focal deficts  Psychiatry: Appropriate affect.  Not agitated  Skin: Warm, dry, no rashes appreciated    Discharge Medications:   Current Discharge Medication List      START taking these medications    Details   risperiDONE (RISPERDAL) 0.25 MG tablet Take 1 tablet by mouth nightly  Qty: 60 tablet, Refills: 3      aspirin 81 MG EC tablet Take 1 tablet by mouth daily  Qty: 30 tablet, Refills: 0      gabapentin (NEURONTIN) 100 MG capsule Take 1 capsule by mouth 3 times daily for 30 days. Qty: 90 capsule, Refills: 0      insulin glargine (LANTUS;BASAGLAR) 100 UNIT/ML injection pen Inject 16 Units into the skin 2 times daily  Qty: 9.6 mL, Refills: 0      insulin lispro, 1 Unit Dial, 100 UNIT/ML SOPN Inject 0-12 Units into the skin 3 times daily (with meals) **Medium Dose Corrective Algorithm**  Glucose: Dose:  If <139             No Insulin  140-199 2 Units  200-249 4 Units  250-299 6 Units  300-349 8 Units  350-400 10 Units  Above 400       12 Units  Qty: 10.8 mL, Refills: 0           Current Discharge Medication List      CONTINUE these medications which have CHANGED    Details   lisinopril (PRINIVIL;ZESTRIL) 2.5 MG tablet Take 1 tablet by mouth daily  Qty: 30 tablet, Refills: 0      pantoprazole (PROTONIX) 40 MG tablet Take 1 tablet by mouth every morning (before breakfast)  Qty: 15 tablet, Refills: 0           Current Discharge Medication List      CONTINUE these medications which have NOT CHANGED    Details   MULTIPLE VITAMINS-MINERALS ER PO Take by mouth      Vitamin E 400 units TABS Take by mouth      blood glucose test strips (ASCENSIA AUTODISC VI;ONE TOUCH ULTRA TEST VI) strip Check blood  Sugars once a day.       fluticasone (FLONASE) 50 MCG/ACT nasal spray 1 spray by Each Nare route daily  Qty: 1 Bottle, Refills: 0           Current Discharge Medication List      STOP taking these medications       sucralfate (CARAFATE) 1 GM tablet Comments:   Reason for Stopping:         desloratadine (CLARINEX REDITAB) 5 MG disintegrating tablet Comments:   Reason for Stopping:         aspirin 81 MG tablet Comments:   Reason for Stopping:         metformin (GLUCOPHAGE) 500 MG tablet Comments:   Reason for Stopping:         glimepiride (AMARYL) 2 MG tablet Comments:   Reason for Stopping:         magnesium oxide (MAG-) 400 MG tablet Comments:   Reason for Stopping:               Labs: For convenience and continuity at follow-up the following most recent labs are provided:    Lab Results   Component Value Date    WBC 4.0 06/05/2021    HGB 12.8 06/05/2021    HCT 38.6 06/05/2021    MCV 95.1 06/05/2021     06/05/2021     06/05/2021    K 4.8 06/05/2021     06/05/2021    CO2 24 06/05/2021    BUN 20 06/05/2021    CREATININE 0.8 06/05/2021    CALCIUM 8.9 06/05/2021    ALKPHOS 135 06/05/2021    ALT 32 06/05/2021    AST 99 06/05/2021    BILITOT <0.2 06/05/2021    LABALBU 3.4 06/05/2021     Lab Results   Component Value Date    INR 1.02 06/04/2021    INR 1.02 04/24/2012       Radiology:  CT ABDOMEN PELVIS WO CONTRAST Additional Contrast? Oral    Result Date: 6/7/2021  EXAMINATION: CT OF THE ABDOMEN AND PELVIS WITHOUT CONTRAST 6/6/2021 4:01 pm TECHNIQUE: CT of the abdomen and pelvis was performed without the administration of intravenous contrast. Multiplanar reformatted images are provided for review. Dose modulation, iterative reconstruction, and/or weight based adjustment of the mA/kV was utilized to reduce the radiation dose to as low as reasonably achievable. COMPARISON: 12/20/2008 HISTORY: ORDERING SYSTEM PROVIDED HISTORY: unexplained weight loss TECHNOLOGIST PROVIDED HISTORY: Reason for exam:->unexplained weight loss Additional Contrast?->Oral Is the patient pregnant?->No Reason for Exam: unexplained weight loss Acuity: Unknown Type of Exam: Unknown FINDINGS: Lower Chest: No acute infiltrate at the lung bases. Organs: Previous cholecystectomy. No significant biliary dilatation. The unenhanced liver, spleen, pancreas and adrenal glands are unremarkable.  There is no evidence of obstructive uropathy. Calcification in the medullary portion of the kidneys, nephrolithiasis versus medullary nephrocalcinosis. There is mild caliectasis on the left with no significant ureterectasis. Findings are new since the comparison study but suggest a chronic UPJ obstruction. GI/Bowel: Mild retained stool throughout the colon. Colonic diverticulosis with no acute features. The appendix is unremarkable. No small bowel distension. The stomach and duodenal sweep are intact. Pelvis: No pelvic mass or free pelvic fluid. The uterus and adnexal structures are unremarkable. Mild distention of the urinary bladder. Peritoneum/Retroperitoneum: The abdominal aorta is normal in caliber with mild diffuse atherosclerotic plaque. Multiple retroperitoneal nodes are present, relatively stable and not significantly enlarged by size criteria. There is no upper abdominal ascites. Bones/Soft Tissues: No acute osseous or soft tissue abnormality. 1. No acute findings within the abdomen or pelvis. Mild retained stool throughout the colon. Colonic diverticulosis with no acute features. 2. Medullary calcifications bilaterally, medullary nephrocalcinosis versus nonobstructive calculi. No evidence of obstructive uropathy. Left-sided caliectasis, likely a mild chronic UPJ obstruction. 3. Stable retroperitoneal nodes, not pathologic by size criteria. XR FEMUR RIGHT (MIN 2 VIEWS)    Result Date: 6/5/2021  EXAMINATION: 4 XRAY VIEWS OF THE RIGHT FEMUR 6/5/2021 3:29 pm COMPARISON: None. HISTORY: ORDERING SYSTEM PROVIDED HISTORY: rt femur pain TECHNOLOGIST PROVIDED HISTORY: Reason for exam:->rt femur pain Reason for Exam: R leg pain. Able to ambulate. Pt c/o both legs hurting Acuity: Chronic Type of Exam: Initial FINDINGS: AP and lateral views demonstrate no acute osseous abnormality. The hip joint is unremarkable. The knee joint is intact as visualized. Vascular calcification is noted in the SFA.      No Acuity: Acute Type of Exam: Initial FINDINGS: The heart is normal in size and configuration. The mediastinal contours are within normal limits. The lungs are well aerated. The pleural surfaces are normal and no evidence of a pleural effusion is seen. Bones and soft tissues are unremarkable. Unremarkable single portable upright AP view of the chest.     VL Extremity Venous Right    Result Date: 6/7/2021  Lower Extremities DVT Study  Demographics   Patient Name      Sameera Villalta   Date of Study     06/05/2021           Gender             Female   Patient Number    [id number]           Date of Birth      1965   Visit Number      [id number]            Age                55 year(s)   Accession Number  [id number]           Room Number        9280   Corporate ID      [id number]             Sonographer        Warden Jones                                                            T   Ordering          Lai Metcalf,               Interpreting       [facility]  Physician         Aruna Bourgeois MD    Physician          Yesenia Saenz MD,                                                            [facility]  Procedure Type of Study:   Veins:Lower Extremities DVT Study, VL EXTREMITY VENOUS DUPLEX RIGHT. Vascular Sonographer Report  Additional Indications:Right calf pain. Impressions Right Impression No evidence of deep vein or superficial vein thrombosis involving the right lower extremity and the left common femoral vein. Conclusions   Summary   No evidence of deep vein or superficial vein thrombosis involving the right  lower extremity and the left common femoral vein. Signature   ------------------------------------------------------------------  Electronically signed by Yesenia Saenz MD, [facility] (Interpreting  physician) on 06/07/2021 at 01:11 AM  ------------------------------------------------------------------  Patient Status:Routine. [address] [place] [address] - Vascular Lab.  Technical Quality:Adequate visualization. Velocities are measured in cm/s ; Diameters are measured in mm Right Lower Extremities DVT Study Measurements Right 2D Measurements +------------------------+----------+---------------+----------+ ! Location                ! Visualized! Compressibility! Thrombosis! +------------------------+----------+---------------+----------+ ! [place] Femoral Junction! Yes       ! Yes            ! None      ! +------------------------+----------+---------------+----------+ ! GSV Thigh               ! Yes       ! Yes            ! None      ! +------------------------+----------+---------------+----------+ ! Common Femoral          !Yes       ! Yes            ! None      ! +------------------------+----------+---------------+----------+ ! Prox Femoral            !Yes       ! Yes            ! None      ! +------------------------+----------+---------------+----------+ ! Mid Femoral             !Yes       ! Yes            ! None      ! +------------------------+----------+---------------+----------+ ! Dist Femoral            !Yes       ! Yes            ! None      ! +------------------------+----------+---------------+----------+ ! Deep Femoral            !Yes       ! Yes            ! None      ! +------------------------+----------+---------------+----------+ ! Popliteal               !Yes       ! Yes            ! None      ! +------------------------+----------+---------------+----------+ ! GSV Below Knee          ! Yes       ! Yes            ! None      ! +------------------------+----------+---------------+----------+ ! Gastroc                 ! Yes       ! Yes            ! None      ! +------------------------+----------+---------------+----------+ ! Soleal                  !Yes       ! Yes            ! None      ! +------------------------+----------+---------------+----------+ ! PTV                     ! Yes       ! Yes            ! None      ! +------------------------+----------+---------------+----------+ ! Jimmy                !Yes !Yes            !None      ! +------------------------+----------+---------------+----------+ ! GSV Calf                ! Yes       ! Yes            ! None      ! +------------------------+----------+---------------+----------+ ! SSV                     ! Yes       ! Yes            ! None      ! +------------------------+----------+---------------+----------+ Right Doppler Measurements +------------------------+------+------+------------+ ! Location                ! Signal!Reflux! Reflux (sec)! +------------------------+------+------+------------+ ! [place] Femoral Junction! Phasic!      !            ! +------------------------+------+------+------------+ ! Common Femoral          !Phasic!      !            ! +------------------------+------+------+------------+ ! Prox Femoral            !Phasic!      !            ! +------------------------+------+------+------------+ ! Deep Femoral            !Phasic!      !            ! +------------------------+------+------+------------+ ! Popliteal               !Phasic!      !            ! +------------------------+------+------+------------+ Left Lower Extremities DVT Study Measurements Left 2D Measurements +--------------+----------+---------------+----------+ ! Location      ! Visualized! Compressibility! Thrombosis! +--------------+----------+---------------+----------+ ! Common Femoral!Yes       ! Yes            ! None      ! +--------------+----------+---------------+----------+ Left Doppler Measurements +--------------+------+------+------------+ ! Location      ! Signal!Reflux! Reflux (sec)! +--------------+------+------+------------+ ! Common Femoral!Phasic!      !            ! +--------------+------+------+------------+    MRI BRAIN WO CONTRAST    Result Date: 6/4/2021  EXAMINATION: MRI OF THE BRAIN WITHOUT CONTRAST  6/4/2021 3:35 pm TECHNIQUE: Multiplanar multisequence MRI of the brain was performed without the administration of intravenous contrast. COMPARISON: CT brain 06/03/2021 HISTORY: ORDERING SYSTEM PROVIDED HISTORY: acute on chronic confusion- r/o stroke , tumor etc TECHNOLOGIST PROVIDED HISTORY: Reason for exam:->acute on chronic confusion- r/o stroke , tumor etc Is the patient pregnant?->No Reason for Exam: Confusion for an unknown amount of time Acuity: Acute Type of Exam: Initial FINDINGS: INTRACRANIAL STRUCTURES/VENTRICLES: There are no areas of restricted diffusion identified to suggest an acute infarct. There is no acute intracranial hemorrhage. No mass effect or midline shift is present. There are multiple foci of abnormal increased T2/FLAIR signal intensity within the periventricular, subcortical and deep white matter of both hemispheres. There is no sellar or suprasellar mass present. There is no ventriculomegaly or abnormal extra-axial fluid collection present. The proximal portions of the [place] demonstrate normal flow voids. ORBITS: Limited evaluation of the orbits is unremarkable. SINUSES: The paranasal sinuses and mastoid air cells are clear. BONES/SOFT TISSUES: Bone marrow signal intensity is normal.     1. No acute infarct or acute intracranial process identified. 2. Mild chronic small vessel ischemic changes.          Signed:    Nam Cano MD   6/8/2021

## 2021-06-08 NOTE — CARE COORDINATION
Discharge Plan:     Patient discharged to:[facility]   SW/DC [facility] faxed, [address] and AVS to:[phone]  Narcotic Prescriptions faxed were:no narcotics, CM did fax prescriptions to above fax number   RN: [facility]  will call report to:     [phone]  Medical Transport with: [facility]  [phone]   time:4:00 pm   Family advised of discharge?:yes CM spoke to pt's sister today   HENS Submitted?:  PASSAR  All discharge needs met per case management.     Elio Pichardo RN, BSN  [phone]

## 2021-06-08 NOTE — PLAN OF CARE
Problem: Falls - Risk of:  Goal: Will remain free from falls  Description: Will remain free from falls  Outcome: Ongoing  Goal: Absence of physical injury  Description: Absence of physical injury  Outcome: Ongoing     Problem: Discharge Planning:  Goal: Discharged to appropriate level of care  Description: Discharged to appropriate level of care  Outcome: Ongoing     Problem: Serum Glucose Level - Abnormal:  Goal: Ability to maintain appropriate glucose levels will improve  Description: Ability to maintain appropriate glucose levels will improve  Outcome: Ongoing     Problem: Sensory Perception - Impaired:  Goal: Ability to maintain a stable neurologic state will improve  Description: Ability to maintain a stable neurologic state will improve  Outcome: Ongoing     Problem: Musculor/Skeletal Functional Status  Goal: Highest potential functional level  Outcome: Ongoing  Goal: Absence of falls  Outcome: Ongoing     Problem: Pain:  Goal: Pain level will decrease  Description: Pain level will decrease  Outcome: Ongoing  Goal: Control of acute pain  Description: Control of acute pain  Outcome: Ongoing  Goal: Control of chronic pain  Description: Control of chronic pain  Outcome: Ongoing

## 2021-06-08 NOTE — CARE COORDINATION
CM called Becky at the [address] and she states pt should be fine to admit today, if not she will call CM back. Both agreed to set up transport time for 4pm today. Rapid covid needed. ELLIOTT ordered rapid covid, notified RN. Cm arrange transport with [facility] for 4pm.    CM called pt's sister Seven and informed her.       Rajwindert Kylee, RN, BSN  [phone]

## 2021-06-08 NOTE — PROGRESS NOTES
Shift assessment completed. Routine vitals stable. Scheduled medications given. Patient is awake, resting in bed. Respirations are easy and unlabored. Patient does not appear to be in distress. Call light within reach.

## 2022-03-24 ENCOUNTER — OFFICE VISIT (OUTPATIENT)
Dept: ENT CLINIC | Age: 57
End: 2022-03-24
Payer: MEDICARE

## 2022-03-24 VITALS
DIASTOLIC BLOOD PRESSURE: 86 MMHG | OXYGEN SATURATION: 95 % | TEMPERATURE: 98.6 F | SYSTOLIC BLOOD PRESSURE: 148 MMHG | HEART RATE: 101 BPM

## 2022-03-24 DIAGNOSIS — K21.9 GASTROESOPHAGEAL REFLUX DISEASE, UNSPECIFIED WHETHER ESOPHAGITIS PRESENT: ICD-10-CM

## 2022-03-24 DIAGNOSIS — J01.90 ACUTE RHINOSINUSITIS: Primary | ICD-10-CM

## 2022-03-24 DIAGNOSIS — R07.0 BURNING SENSATION OF THROAT: ICD-10-CM

## 2022-03-24 PROCEDURE — 99214 OFFICE O/P EST MOD 30 MIN: CPT | Performed by: OTOLARYNGOLOGY

## 2022-03-24 PROCEDURE — G8484 FLU IMMUNIZE NO ADMIN: HCPCS | Performed by: OTOLARYNGOLOGY

## 2022-03-24 PROCEDURE — G8420 CALC BMI NORM PARAMETERS: HCPCS | Performed by: OTOLARYNGOLOGY

## 2022-03-24 PROCEDURE — 1036F TOBACCO NON-USER: CPT | Performed by: OTOLARYNGOLOGY

## 2022-03-24 PROCEDURE — G8427 DOCREV CUR MEDS BY ELIG CLIN: HCPCS | Performed by: OTOLARYNGOLOGY

## 2022-03-24 PROCEDURE — 3017F COLORECTAL CA SCREEN DOC REV: CPT | Performed by: OTOLARYNGOLOGY

## 2022-03-24 RX ORDER — CEFDINIR 300 MG/1
600 CAPSULE ORAL DAILY
Qty: 28 CAPSULE | Refills: 0 | Status: SHIPPED | OUTPATIENT
Start: 2022-03-24 | End: 2022-04-07

## 2022-03-24 ASSESSMENT — ENCOUNTER SYMPTOMS
VOICE CHANGE: 0
SORE THROAT: 0
TROUBLE SWALLOWING: 0
SINUS PAIN: 0
RHINORRHEA: 0

## 2022-03-24 NOTE — PATIENT INSTRUCTIONS
RHINOSINUSITIS CARE  · You may use acetaminophen (eg. Tylenol) or Ibuprofen (eg. Advil) (over the counter medications) as needed for fever or pain. · Take Probiotic while you are taking antibiotics, to prevent diarrhea, stomach upset, pseudomembranous colitis, and C. difficile diarrhea. This may be obtained at your pharmacy or health food store. Alternatively, you may eat one cup of yogurt with active or live cultures twice daily while taking the antibiotic. Continue for two to three days after completion of the antibiotic. · Use a 12 hour decongestant spray, 0.05% oxymetazoline (e.g. Afrin, Duration, 4-Way). Spray each nostril twice three times a day for three days, then two times a day for 2 days, and then stop for two days and then repeat the cycle once. · Take one Mucinex-D (red orange box) maximum strength tablet each morning and one Mucinex (blue box) maximum strength tablet each evening, about 12 hours later, daily for the next ten days. · A steam inhaler mask device may be helpful. These can be purchased at your pharmacy. · Use a saline nasal/sinus irrigation system, e.g. NeilMed sinus rinse, twice daily to help to clear secretions and drainage. Use distilled water; DO NOT USE TAP WATER! This is because of the possibility of amoeba or other microorganisms in tap water, which can result in a fatal disease. Alternatively, you could use a blue bulb syringe and solution of 1/4 tsp of table salt in 8 ounces (one cup or 240 ml) of distilled water. · Use fluticasone 2 sprays in each nostril once daily. · It may take several days to several weeks for your sinusitis to clear up. It is important to take all your medications as prescribed. Please continue your antibiotics as prescribed.     · Please call the office if your condition worsens or if symptoms persist after treatment is completed.        ===================================================================      ADVERSE AND SIDE EFFECTS OF MEDICATIONS:    Please be aware of the following possible adverse reactions, side effects, and complications of the following medications, including, but not limited to: allergic reaction, interactions with other medications, nausea, headache, diarrhea, persistent symptoms, failure to improve, and the following:     Omnicef (antibiotic):  diarrhea, colitis (severe infection and inflammation of the large intestine), pseudomembranous colitis (severe infection and inflammation of the colon, usually due to C. difficile bacteria)  yeast or fungal  infections, Neely-Eyad syndrome (very rare necrotic skin reaction with peeling of skin, blisters and arthritis), persistent symptoms/infection, and failure to improve. Fluticasone:  nosebleed, burning sensation, atrophy of nasal mucosa, septal ulceration or perforation, nasal irritation, nasal yeast infection,  drowsiness, bad taste.      ~~~>>> Also please read the medication information in this AVS and all information given to you by the pharmacist.         Patient Education        cefdinir  Pronunciation:  BRIA lincoln  Brand:  Monique Montalvo Omni-Pac  What is the most important information I should know about cefdinir? Do not take this medicine if you are allergic to cefdinir, or to similar antibiotics, such as Ceftin, Cefzil, Keflex, and others. What is cefdinir? Cefdinir is a cephalosporin (SEF a low spor in) antibiotic that is used to treat many different types of infections caused by bacteria. Cefdinir may also be used for purposes not listed in this medication guide. What should I discuss with my healthcare provider before taking cefdinir? You should not take this medicine if you are allergic to cefdinir or any other cephalosporin antibiotic (cefadroxil, cefprozil, cefazolin, cefalexin, Keflex, and others).   Tell your doctor if you have ever had:  · kidney disease (or if you are on dialysis);  · intestinal problems, such as colitis; or  · an allergy to any drugs (especially penicillins). Cefdinir liquid contains sucrose. Talk to your doctor before using this form of cefdinir if you have diabetes. Tell your doctor if you are pregnant or breastfeeding. How should I take cefdinir? Follow all directions on your prescription label and read all medicine guides or instruction sheets. Use the medicine exactly as directed. Shake the oral suspension (liquid) before you measure a dose. Use the dosing syringe provided, or use a medicine dose-measuring device (not a kitchen spoon). You may take cefdinir with or without food. Use this medicine for the full prescribed length of time, even if your symptoms quickly improve. Skipping doses can increase your risk of infection that is resistant to medication. Cefdinir will not treat a viral infection such as the flu or a common cold. Cefdinir can affect the results of certain medical tests. Tell any doctor who treats you that you are using cefdinir. Store at room temperature away from moisture and heat. Throw away any unused cefdinir liquid that is older than 10 days. What happens if I miss a dose? Take the medicine as soon as you can, but skip the missed dose if it is almost time for your next dose. Do not take two doses at one time. What happens if I overdose? Seek emergency medical attention or call the Poison Help line at 1-922.102.9011. Overdose symptoms may include nausea, vomiting, stomach pain, diarrhea, or a seizure. What should I avoid while taking cefdinir? Avoid using antacids or mineral supplements that contain aluminum, magnesium, or iron within 2 hours before or after taking cefdinir. Antacids or iron can make it harder for your body to absorb cefdinir. This does not include baby formula fortified with iron. Antibiotic medicines can cause diarrhea, which may be a sign of a new infection. If you have diarrhea that is watery or bloody, call your doctor before using anti-diarrhea medicine.   What are the made to that effect. Drug information contained herein may be time sensitive. [employer] information has been compiled for use by healthcare practitioners and consumers in the [place] and therefore [employer] does not warrant that uses outside of the [place] are appropriate, unless specifically indicated otherwise. [facility] drug information does not endorse drugs, diagnose patients or recommend therapy. [facility]'s drug information is an informational resource designed to assist licensed healthcare practitioners in caring for their patients and/or to serve consumers viewing this service as a supplement to, and not a substitute for, the expertise, skill, knowledge and judgment of healthcare practitioners. The absence of a warning for a given drug or drug combination in no way should be construed to indicate that the drug or drug combination is safe, effective or appropriate for any given patient. [facility] does not assume any responsibility for any aspect of healthcare administered with the aid of information [facility] provides. The information contained herein is not intended to cover all possible uses, directions, precautions, warnings, drug interactions, allergic reactions, or adverse effects. If you have questions about the drugs you are taking, check with your doctor, nurse or pharmacist.  Copyright 8318-7281 [address] [place] [address] Dr BRYAN. Version: 7.03. Revision date: 1/4/2021. Care instructions adapted under license by [facility] ([facility]). If you have questions about a medical condition or this instruction, always ask your healthcare professional. [facility] [address] any warranty or liability for your use of this information. Patient Education        fluticasone nasal  Pronunciation:  floo TIK a sone  Brand:  Flonase, Veramyst, Ramonia Chandrakant  What is the most important information I should know about fluticasone nasal?  Follow all directions on your medicine label and package.  Tell each of your healthcare providers about all your medical conditions, allergies, and all medicines you use. What is fluticasone nasal?  Fluticasone nasal (for the nose) is a steroid medicine that is used to treat nasal congestion, sneezing, runny nose, and itchy or watery eyes caused by seasonal or year-round allergies. The Juanita brand of this medicine is for use only in adults. Veramyst may be used in children as young as 3years old. Flonase is for use in adults and children who are at least 3years old. Fluticasone nasal may also be used for purposes not listed in this medication guide. What should I discuss with my healthcare provider before using fluticasone nasal?  You should not use fluticasone nasal if you are allergic to it. Fluticasone can weaken your immune system,  making it easier for you to get an infection or worsening an infection you already have or recently had. Tell your doctor about any illness or infection you have had within the past several weeks. Tell your doctor if you have ever had:  · sores or ulcers inside your nose;  · injury of or surgery on your nose;  · glaucoma or cataracts;  · liver disease;  · diabetes;  · a weak immune system; or  · any type of infection (bacterial, fungal, viral, or parasitic). If you use fluticasone nasal without a prescription and you have any medical conditions, ask a doctor or pharmacist if this medicine is safe for you. Tell your doctor if you are pregnant or breast-feeding. How should I use fluticasone nasal?  Follow all directions on your prescription label and read all medication guides or instruction sheets. Use the medicine exactly as directed. Do not share this medicine with another person, even if they have the same symptoms you have. Your dose will depend on the fluticasone brand or strength you use, and your dose may change once your symptoms improve. Follow all dosing instructions very carefully. A child using the nasal spray should be supervised by an adult.   Read and

## 2022-03-24 NOTE — PROGRESS NOTES
[facility]       PCP:  Yesi Glass MD      [facility]  Chief Complaint   Patient presents with    Sinus Problem     drainage     Other     history of GERD, throat is burning        HISTORY OF PRESENT ILLNESS       Deb Gonzalez is a 55 y.o. female here for evaluation and treatment of sinusitis with post nasal drainage for past two to three weeks. Nose and at sternal level, past 2-3 weeks, off and on, daily, starts in morning and burns for about 20 minutes, sometimes might burn later in the day if I eat spicy food. Here with her sister, [address]   Review of Systems   Constitutional: Positive for fatigue. Negative for chills and fever. HENT: Positive for congestion. Negative for ear discharge, ear pain, hearing loss, rhinorrhea, sinus pain, sore throat, tinnitus, trouble swallowing and voice change. Neurological: Negative for dizziness. PAST MEDICAL HISTORY    Past Medical History:   Diagnosis Date    Diabetes mellitus ([address]     Hyperlipidemia     Hypertension        Past Surgical History:   Procedure Laterality Date    CHOLECYSTECTOMY      FINGER SURGERY           EXAMINATION    Vitals:    03/24/22 1422   BP: (!) 148/86   Site: Right Upper Arm   Position: Sitting   Cuff Size: Medium Adult   Pulse: 101   Temp: 98.6 °F (37 °C)   TempSrc: Temporal   SpO2: 95%        ENT Physical Exam  Consititutional  Appearance: patient appears well-developed, well-nourished and well-groomed, patient is cooperative;  Communication/Voice: communication appropriate for developmental age; vocal quality normal;  Head and Face  Appearance: head appears normal and face appears atraumatic; facial scars (reconstructed nose post fore head flap) present;  Palpation: facial palpation normal; no sinus tenderness present;   Salivary: glands normal;  Ear  Hearing: not impaired to conversational voice;  normal to finger rub bilaterally; Rinne AC > BC bilaterally; does not lateralize; Auricles: right auricle normal; left auricle normal;  Ear Canals: right ear canal normal; left ear canal normal;  Tympanic Membranes: right tympanic membrane normal; left tympanic membrane normal;  Nose  External Nose: nares patent bilaterally; external nose normal;  Internal Nose: nasal mucosa normal; bilateral inferior turbinates normal;  Nose comments: Crusting on left anterior septum with prominent vessel which bled profusely when rubbed with cotton tipped applicator. Oral Cavity/Oropharynx  Lips: normal;  Tongue: normal;  Oral mucosa: normal;  Hard palate: normal;  Soft palate: normal;  Tonsils: bilateral tonsils 1+,  Posterior pharyngeal wall: normal;  Nasopharynx/Hypopharynx/Larynx  Hypopharynx: normal;  Larynx Findings: normal; excellent view; epiglottis normal; aryepiglottis normal; false vocal cords normal; arytenoids normal; bilateral true vocal cords normal;  Neck  Neck: neck normal; neck palpation normal;  Thyroid: thyroid normal;  Lymphatic  Palpation: no cervical adenopathy noted; no preauricular adenopathy palpable;            IMPRESSION / DIAGNOSES / Paulwatson Mccarthy was seen today for sinus problem and other. Diagnoses and all orders for this visit:    Acute rhinosinusitis  -     cefdinir (OMNICEF) 300 MG capsule; Take 2 capsules by mouth daily for 14 days Take both capsules together at the same time, once daily. Burning sensation of throat    Gastroesophageal reflux disease, unspecified whether esophagitis present           RECOMMENDATIONS/PLAN        1. See patient instructions on file for this visit. 2. Acetaminophen (eg. Tylenol) or Ibuprofen (eg. Advil) (over the counter medications) as needed for fever or pain. 3. Return if symptoms worsen or fail to clear up with treatment or, for any further ENT or sinus problems or symptoms.         Patient Instructions   RHINOSINUSITIS CARE  · You may use acetaminophen (eg. Tylenol) or Ibuprofen (eg. Advil) (over the counter medications) as needed for fever or pain. · Take Probiotic while you are taking antibiotics, to prevent diarrhea, stomach upset, pseudomembranous colitis, and C. difficile diarrhea. This may be obtained at your pharmacy or health food store. Alternatively, you may eat one cup of yogurt with active or live cultures twice daily while taking the antibiotic. Continue for two to three days after completion of the antibiotic. · Use a 12 hour decongestant spray, 0.05% oxymetazoline (e.g. Afrin, Duration, 4-Way). Spray each nostril twice three times a day for three days, then two times a day for 2 days, and then stop for two days and then repeat the cycle once. · Take one Mucinex-D (red orange box) maximum strength tablet each morning and one Mucinex (blue box) maximum strength tablet each evening, about 12 hours later, daily for the next ten days. · A steam inhaler mask device may be helpful. These can be purchased at your pharmacy. · Use a saline nasal/sinus irrigation system, e.g. NeilMed sinus rinse, twice daily to help to clear secretions and drainage. Use distilled water; DO NOT USE TAP WATER! This is because of the possibility of amoeba or other microorganisms in tap water, which can result in a fatal disease. Alternatively, you could use a blue bulb syringe and solution of 1/4 tsp of table salt in 8 ounces (one cup or 240 ml) of distilled water. · Use fluticasone 2 sprays in each nostril once daily. · It may take several days to several weeks for your sinusitis to clear up. It is important to take all your medications as prescribed. Please continue your antibiotics as prescribed. · Please call the office if your condition worsens or if symptoms persist after treatment is completed.

## 2022-07-12 ENCOUNTER — HOSPITAL ENCOUNTER (EMERGENCY)
Age: 57
Discharge: HOME | End: 2022-07-13
Payer: MEDICARE

## 2022-07-12 VITALS
RESPIRATION RATE: 17 BRPM | SYSTOLIC BLOOD PRESSURE: 173 MMHG | OXYGEN SATURATION: 100 % | HEART RATE: 98 BPM | TEMPERATURE: 98.6 F | DIASTOLIC BLOOD PRESSURE: 88 MMHG

## 2022-07-12 DIAGNOSIS — J01.00 ACUTE MAXILLARY SINUSITIS, RECURRENCE NOT SPECIFIED: Primary | ICD-10-CM

## 2022-07-12 DIAGNOSIS — G44.89 OTHER HEADACHE SYNDROME: ICD-10-CM

## 2022-07-12 LAB
A/G RATIO: 1.1 (ref 1.1–2.2)
ALBUMIN SERPL-MCNC: 3.9 G/DL (ref 3.4–5)
ALP BLD-CCNC: 164 U/L (ref 40–129)
ALT SERPL-CCNC: 17 U/L (ref 10–40)
ANION GAP SERPL CALCULATED.3IONS-SCNC: 13 MMOL/L (ref 3–16)
AST SERPL-CCNC: 32 U/L (ref 15–37)
BASOPHILS ABSOLUTE: 0 K/UL (ref 0–0.2)
BASOPHILS RELATIVE PERCENT: 0.7 %
BILIRUB SERPL-MCNC: <0.2 MG/DL (ref 0–1)
BILIRUBIN URINE: NEGATIVE
BLOOD, URINE: NEGATIVE
BUN BLDV-MCNC: 15 MG/DL (ref 7–20)
CALCIUM SERPL-MCNC: 9.4 MG/DL (ref 8.3–10.6)
CHLORIDE BLD-SCNC: 102 MMOL/L (ref 99–110)
CLARITY: CLEAR
CO2: 23 MMOL/L (ref 21–32)
COLOR: YELLOW
CREAT SERPL-MCNC: 0.7 MG/DL (ref 0.6–1.1)
EOSINOPHILS ABSOLUTE: 0.1 K/UL (ref 0–0.6)
EOSINOPHILS RELATIVE PERCENT: 2 %
GFR AFRICAN AMERICAN: >60
GFR NON-AFRICAN AMERICAN: >60
GLUCOSE BLD-MCNC: 337 MG/DL (ref 70–99)
GLUCOSE URINE: >=1000 MG/DL
HCT VFR BLD CALC: 28.8 % (ref 36–48)
HEMOGLOBIN: 9.3 G/DL (ref 12–16)
INFLUENZA A: NOT DETECTED
INFLUENZA B: NOT DETECTED
KETONES, URINE: NEGATIVE MG/DL
LEUKOCYTE ESTERASE, URINE: NEGATIVE
LIPASE: 60 U/L (ref 13–60)
LYMPHOCYTES ABSOLUTE: 1.4 K/UL (ref 1–5.1)
LYMPHOCYTES RELATIVE PERCENT: 33.1 %
MCH RBC QN AUTO: 29.4 PG (ref 26–34)
MCHC RBC AUTO-ENTMCNC: 32.2 G/DL (ref 31–36)
MCV RBC AUTO: 91.4 FL (ref 80–100)
MICROSCOPIC EXAMINATION: ABNORMAL
MONOCYTES ABSOLUTE: 0.4 K/UL (ref 0–1.3)
MONOCYTES RELATIVE PERCENT: 9.2 %
NEUTROPHILS ABSOLUTE: 2.4 K/UL (ref 1.7–7.7)
NEUTROPHILS RELATIVE PERCENT: 55 %
NITRITE, URINE: NEGATIVE
PDW BLD-RTO: 13.3 % (ref 12.4–15.4)
PH UA: 6.5 (ref 5–8)
PLATELET # BLD: 198 K/UL (ref 135–450)
PMV BLD AUTO: 7.4 FL (ref 5–10.5)
POTASSIUM REFLEX MAGNESIUM: 4.3 MMOL/L (ref 3.5–5.1)
PROTEIN UA: NEGATIVE MG/DL
RBC # BLD: 3.15 M/UL (ref 4–5.2)
SARS-COV-2 RNA, RT PCR: NOT DETECTED
SODIUM BLD-SCNC: 138 MMOL/L (ref 136–145)
SPECIFIC GRAVITY UA: >=1.03 (ref 1–1.03)
TOTAL PROTEIN: 7.4 G/DL (ref 6.4–8.2)
URINE REFLEX TO CULTURE: ABNORMAL
URINE TYPE: ABNORMAL
UROBILINOGEN, URINE: 1 E.U./DL
WBC # BLD: 4.3 K/UL (ref 4–11)

## 2022-07-12 PROCEDURE — 87636 SARSCOV2 & INF A&B AMP PRB: CPT

## 2022-07-12 PROCEDURE — 85025 COMPLETE CBC W/AUTO DIFF WBC: CPT

## 2022-07-12 PROCEDURE — 6370000000 HC RX 637 (ALT 250 FOR IP): Performed by: PHYSICIAN ASSISTANT

## 2022-07-12 PROCEDURE — 36415 COLL VENOUS BLD VENIPUNCTURE: CPT

## 2022-07-12 PROCEDURE — 81003 URINALYSIS AUTO W/O SCOPE: CPT

## 2022-07-12 PROCEDURE — 83690 ASSAY OF LIPASE: CPT

## 2022-07-12 PROCEDURE — 80053 COMPREHEN METABOLIC PANEL: CPT

## 2022-07-12 PROCEDURE — 99283 EMERGENCY DEPT VISIT LOW MDM: CPT

## 2022-07-12 RX ORDER — ONDANSETRON 4 MG/1
4 TABLET, ORALLY DISINTEGRATING ORAL ONCE
Status: COMPLETED | OUTPATIENT
Start: 2022-07-12 | End: 2022-07-12

## 2022-07-12 RX ORDER — ACETAMINOPHEN 500 MG
1000 TABLET ORAL ONCE
Status: COMPLETED | OUTPATIENT
Start: 2022-07-12 | End: 2022-07-12

## 2022-07-12 RX ADMIN — ONDANSETRON 4 MG: 4 TABLET, ORALLY DISINTEGRATING ORAL at 22:38

## 2022-07-12 RX ADMIN — ACETAMINOPHEN 1000 MG: 500 TABLET ORAL at 22:38

## 2022-07-12 ASSESSMENT — ENCOUNTER SYMPTOMS
CHEST TIGHTNESS: 0
NAUSEA: 1
FACIAL SWELLING: 0
COUGH: 0
ABDOMINAL DISTENTION: 0
SORE THROAT: 0
ABDOMINAL PAIN: 0
BLOOD IN STOOL: 0
SINUS PAIN: 1
WHEEZING: 0
SHORTNESS OF BREATH: 0
SINUS PRESSURE: 1

## 2022-07-13 NOTE — ED PROVIDER NOTES
[facility]        Pt Name: Cody Davila  MRN: [id number]  [place] 1965  Date of evaluation: 7/12/2022  Provider: MARIAN Cordero  PCP: Tracy Long MD  Note Started: 9:51 PM EDT       TOÑO. I have evaluated this patient. My supervising physician was available for consultation. CHIEF COMPLAINT       Chief Complaint   Patient presents with    Headache     getting worse for the past couple of weeks        HISTORY OF PRESENT ILLNESS   (Location, Timing/Onset, Context/Setting, Quality, Duration, Modifying Factors, Severity, Associated Signs and Symptoms)  Note limiting factors. Chief Complaint: Headache and nausea vomiting    Cody Davila is a 65 y.o. female who presents to the emergency department with headache for the past week and nausea vomiting starting yesterday. Patient is from [address] [place] [address] and was sent here due to this headache and nausea vomiting. Patient states that her headache is actually coming from sinuses causing her headache pain. Patient states that she also had some nausea and vomiting today while she was at her facility. Patient rates her headache a 3 out of 10 pain. Patient states that she has a lot of stress and anxiety presents with her mom not longer. Patient does have a positive history of dementia, diabetes, hyperlipidemia and hypertension. Patient denies any chest pain, shortness of breath or difficulty breathing. Nursing Notes were all reviewed and agreed with or any disagreements were addressed in the HPI. REVIEW OF SYSTEMS    (2-9 systems for level 4, 10 or more for level 5)     Review of Systems   Constitutional: Negative for appetite change, chills, diaphoresis, fatigue and fever. HENT: Positive for sinus pressure and sinus pain. Negative for congestion, ear pain, facial swelling, hearing loss and sore throat. Eyes: Negative for visual disturbance.    Respiratory: Negative for cough, chest tightness, shortness of breath and wheezing. Cardiovascular: Negative for chest pain, palpitations and leg swelling. Gastrointestinal: Positive for nausea. Negative for abdominal distention, abdominal pain and blood in stool. Genitourinary: Negative for dysuria, flank pain and frequency. Neurological: Negative for dizziness, seizures, facial asymmetry and headaches. Positives and Pertinent negatives as per HPI. Except as noted above in the ROS, all other systems were reviewed and negative. PAST MEDICAL HISTORY     Past Medical History:   Diagnosis Date    Diabetes mellitus ([facility] [address]     Hyperlipidemia     Hypertension          SURGICAL HISTORY     Past Surgical History:   Procedure Laterality Date    CHOLECYSTECTOMY      FINGER SURGERY           CURRENTMEDICATIONS       Previous Medications    ASPIRIN 81 MG EC TABLET    Take 1 tablet by mouth daily    BLOOD GLUCOSE TEST STRIPS (ASCENSIA AUTODISC VI;ONE TOUCH ULTRA TEST VI) STRIP    Check blood  Sugars once a day. FLUTICASONE (FLONASE) 50 MCG/ACT NASAL SPRAY    1 spray by Each Nare route daily    GABAPENTIN (NEURONTIN) 100 MG CAPSULE    Take 1 capsule by mouth 3 times daily for 30 days.     INSULIN GLARGINE (LANTUS;BASAGLAR) 100 UNIT/ML INJECTION PEN    Inject 16 Units into the skin 2 times daily    INSULIN LISPRO, 1 UNIT DIAL, 100 UNIT/ML SOPN    Inject 0-12 Units into the skin 3 times daily (with meals) **Medium Dose Corrective Algorithm**  Glucose: Dose:  If <139             No Insulin  140-199 2 Units  200-249 4 Units  250-299 6 Units  300-349 8 Units  350-400 10 Units  Above 400       12 Units    LISINOPRIL (PRINIVIL;ZESTRIL) 2.5 MG TABLET    Take 1 tablet by mouth daily    MULTIPLE VITAMINS-MINERALS ER PO    Take by mouth    PANTOPRAZOLE (PROTONIX) 40 MG TABLET    Take 1 tablet by mouth every morning (before breakfast)    RISPERIDONE (RISPERDAL) 0.25 MG TABLET    Take 1 tablet by mouth nightly    VITAMIN E 400 UNITS TABS    Take by mouth         ALLERGIES     Patient has no known allergies. FAMILYHISTORY     No family history on file. SOCIAL HISTORY       Social History     Tobacco Use    Smoking status: Never Smoker    Smokeless tobacco: Never Used   Substance Use Topics    Alcohol use: No    Drug use: No       SCREENINGS             PHYSICAL EXAM    (up to 7 for level 4, 8 or more for level 5)     ED Triage Vitals [07/12/22 2136]   BP Temp Temp Source Heart Rate Resp SpO2 Height Weight   (!) 152/81 98.6 °F (37 °C) Oral 85 20 100 % -- --       Physical Exam  Vitals and nursing note reviewed. Constitutional:       General: She is not in acute distress. Appearance: She is normal weight. She is not ill-appearing, toxic-appearing or diaphoretic. HENT:      Head: Normocephalic. Mouth/Throat:      Mouth: Mucous membranes are moist.      Pharynx: Oropharynx is clear. No oropharyngeal exudate or posterior oropharyngeal erythema. Eyes:      Pupils: Pupils are equal, round, and reactive to light. Cardiovascular:      Rate and Rhythm: Normal rate and regular rhythm. Pulses: Normal pulses. Heart sounds: Normal heart sounds. No murmur heard. No friction rub. Pulmonary:      Effort: Pulmonary effort is normal. No respiratory distress. Breath sounds: Normal breath sounds. No stridor. Abdominal:      General: Bowel sounds are normal. There is no distension. Palpations: There is no mass. Tenderness: There is no abdominal tenderness. Musculoskeletal:      Cervical back: Normal range of motion and neck supple. Neurological:      Mental Status: She is alert.    Psychiatric:         Mood and Affect: Mood normal.         Behavior: Behavior normal.         DIAGNOSTIC RESULTS   LABS:    Labs Reviewed   CBC WITH AUTO DIFFERENTIAL - Abnormal; Notable for the following components:       Result Value    RBC 3.15 (*)     Hemoglobin 9.3 (*)     Hematocrit 28.8 (*)     All other components within normal limits   COMPREHENSIVE METABOLIC PANEL W/ REFLEX TO MG FOR LOW K - Abnormal; Notable for the following components:    Glucose 337 (*)     Alkaline Phosphatase 164 (*)     All other components within normal limits   COVID-19 & INFLUENZA COMBO   LIPASE   URINALYSIS WITH REFLEX TO CULTURE       When ordered only abnormal lab results are displayed. All other labs were within normal range or not returned as of this dictation. EKG: When ordered, EKG's are interpreted by the Emergency Department Physician in the absence of a cardiologist.  Please see their note for interpretation of EKG. RADIOLOGY:   Non-plain film images such as CT, Ultrasound and MRI are read by the radiologist. Plain radiographic images are visualized and preliminarily interpreted by the ED Provider with the below findings:        Interpretation per the Radiologist below, if available at the time of this note:    No orders to display     No results found. PROCEDURES   Unless otherwise noted below, none     Procedures    CRITICAL CARE TIME       CONSULTS:  None      EMERGENCY DEPARTMENT COURSE and DIFFERENTIAL DIAGNOSIS/MDM:   Vitals:    Vitals:    07/12/22 2203 07/12/22 2213 07/12/22 2223 07/12/22 2233   BP: (!) 148/84 (!) 143/81  (!) 148/86   Pulse: 88 93 85 79   Resp: (!) 0 22 20 23   Temp:       TempSrc:       SpO2:           Patient was given the following medications:  Medications   ondansetron (ZOFRAN-ODT) disintegrating tablet 4 mg (4 mg Oral Given 7/12/22 2238)   acetaminophen (TYLENOL) tablet 1,000 mg (1,000 mg Oral Given 7/12/22 2238)         Is this patient to be included in the SEP-1 Core Measure due to severe sepsis or septic shock? No   Exclusion criteria - the patient is NOT to be included for SEP-1 Core Measure due to: Infection is not suspected    72-year-old female presents to the emergency department due to sinus headache for the past few weeks.   Patient has a benign exam and reassuring Neruo exam.   In the emergency department patient's work-up did not show any acute abnormalities. Further details from the patient's facility stating that the patient was also having abdominal pain and nausea. This reason CBC CMP and lipase testing were ordered which showed acute abnormalities. His COVID-19 testing was negative for influenza testing. Patient was given Zofran and Tylenol for headache and for nausea this did significantly improve her symptoms. Patient denies any chest pain, shortness of breath or difficulty breathing. Patient without any falls or injuries. At this time I do not feel that further imaging is needed given that her symptoms are improved and her work-up has been benign here in the emergency department. Patient be discharged at this time to return to her facility to return if symptoms should get worse. Patient understands agrees with this plan all questions were answered. FINAL IMPRESSION      1. Acute maxillary sinusitis, recurrence not specified    2. Other headache syndrome          DISPOSITION/PLAN   DISPOSITION        PATIENT REFERRED TO:  No follow-up provider specified.     DISCHARGE MEDICATIONS:  New Prescriptions    No medications on file       DISCONTINUED MEDICATIONS:  Discontinued Medications    No medications on file              (Please note that portions of this note were completed with a voice recognition program.  Efforts were made to edit the dictations but occasionally words are mis-transcribed.)    MARIAN Prieto (electronically signed)            MARIAN Prieto  07/12/22 3765

## 2022-07-13 NOTE — ED NOTES
[facility] for more info. Pt c/o stomach pain and vomiting that has occurred for weeks. She called 911 herself. Hx of dementia.       Hodges Boast, RN  07/12/22 1193

## 2025-03-29 ENCOUNTER — APPOINTMENT (OUTPATIENT)
Dept: CT IMAGING | Age: 60
DRG: 720 | End: 2025-03-29
Payer: MEDICAID

## 2025-03-29 ENCOUNTER — HOSPITAL ENCOUNTER (INPATIENT)
Age: 60
LOS: 4 days | Discharge: NURSING, REHAB OR LONG-TERM CARE | DRG: 720 | End: 2025-04-02
Attending: EMERGENCY MEDICINE | Admitting: INTERNAL MEDICINE
Payer: MEDICAID

## 2025-03-29 DIAGNOSIS — E87.0 HYPERNATREMIA: ICD-10-CM

## 2025-03-29 DIAGNOSIS — R41.82 ALTERED MENTAL STATUS, UNSPECIFIED ALTERED MENTAL STATUS TYPE: ICD-10-CM

## 2025-03-29 DIAGNOSIS — J18.9 MULTIFOCAL PNEUMONIA: Primary | ICD-10-CM

## 2025-03-29 DIAGNOSIS — E86.0 DEHYDRATION: ICD-10-CM

## 2025-03-29 PROBLEM — A41.9 SEPSIS DUE TO PNEUMONIA (HCC): Status: ACTIVE | Noted: 2025-03-29

## 2025-03-29 PROBLEM — A41.9 SEPSIS (HCC): Status: ACTIVE | Noted: 2025-03-29

## 2025-03-29 LAB
ALBUMIN SERPL-MCNC: 3.8 G/DL (ref 3.4–5)
ALBUMIN/GLOB SERPL: 0.8 {RATIO} (ref 1.1–2.2)
ALP SERPL-CCNC: 116 U/L (ref 40–129)
ALT SERPL-CCNC: 35 U/L (ref 10–40)
AMMONIA PLAS-SCNC: 17 UMOL/L (ref 11–51)
AMPHETAMINES UR QL SCN>1000 NG/ML: NORMAL
ANION GAP SERPL CALCULATED.3IONS-SCNC: 14 MMOL/L (ref 3–16)
AST SERPL-CCNC: 36 U/L (ref 15–37)
BACTERIA URNS QL MICRO: NORMAL /HPF
BARBITURATES UR QL SCN>200 NG/ML: NORMAL
BASE EXCESS BLDV CALC-SCNC: 3 MMOL/L (ref -3–3)
BASOPHILS # BLD: 0 K/UL (ref 0–0.2)
BASOPHILS NFR BLD: 0 %
BENZODIAZ UR QL SCN>200 NG/ML: NORMAL
BILIRUB SERPL-MCNC: 0.3 MG/DL (ref 0–1)
BILIRUB UR QL STRIP.AUTO: NEGATIVE
BUN SERPL-MCNC: 31 MG/DL (ref 7–20)
CALCIUM SERPL-MCNC: 10.2 MG/DL (ref 8.3–10.6)
CANNABINOIDS UR QL SCN>50 NG/ML: NORMAL
CHLORIDE SERPL-SCNC: 114 MMOL/L (ref 99–110)
CLARITY UR: CLEAR
CO2 BLDV-SCNC: 74 MMOL/L
CO2 SERPL-SCNC: 28 MMOL/L (ref 21–32)
COCAINE UR QL SCN: NORMAL
COHGB MFR BLDV: 1.8 % (ref 0–1.5)
COLOR UR: YELLOW
CREAT SERPL-MCNC: 0.9 MG/DL (ref 0.6–1.1)
DEPRECATED RDW RBC AUTO: 13.7 % (ref 12.4–15.4)
DO-HGB MFR BLDV: 36 %
DRUG SCREEN COMMENT UR-IMP: NORMAL
EKG ATRIAL RATE: 121 BPM
EKG DIAGNOSIS: NORMAL
EKG P AXIS: 70 DEGREES
EKG P-R INTERVAL: 136 MS
EKG Q-T INTERVAL: 282 MS
EKG QRS DURATION: 54 MS
EKG QTC CALCULATION (BAZETT): 400 MS
EKG R AXIS: -14 DEGREES
EKG T AXIS: 115 DEGREES
EKG VENTRICULAR RATE: 121 BPM
EOSINOPHIL # BLD: 0 K/UL (ref 0–0.6)
EOSINOPHIL NFR BLD: 0 %
EPI CELLS #/AREA URNS AUTO: 1 /HPF (ref 0–5)
ETHANOLAMINE SERPL-MCNC: NORMAL MG/DL (ref 0–0.08)
FENTANYL SCREEN, URINE: NORMAL
FLUAV RNA RESP QL NAA+PROBE: DETECTED
FLUBV RNA RESP QL NAA+PROBE: NOT DETECTED
GFR SERPLBLD CREATININE-BSD FMLA CKD-EPI: 74 ML/MIN/{1.73_M2}
GLUCOSE BLD-MCNC: 108 MG/DL (ref 70–99)
GLUCOSE BLD-MCNC: 223 MG/DL (ref 70–99)
GLUCOSE BLD-MCNC: 71 MG/DL (ref 70–99)
GLUCOSE BLD-MCNC: 71 MG/DL (ref 70–99)
GLUCOSE SERPL-MCNC: 380 MG/DL (ref 70–99)
GLUCOSE UR STRIP.AUTO-MCNC: >=1000 MG/DL
HCO3 BLDV-SCNC: 30.9 MMOL/L (ref 23–29)
HCT VFR BLD AUTO: 49.1 % (ref 36–48)
HGB BLD-MCNC: 15.5 G/DL (ref 12–16)
HGB UR QL STRIP.AUTO: NEGATIVE
HYALINE CASTS #/AREA URNS AUTO: 1 /LPF (ref 0–8)
INR PPP: 1.05 (ref 0.85–1.15)
KETONES UR STRIP.AUTO-MCNC: NEGATIVE MG/DL
LACTATE BLDV-SCNC: 2.8 MMOL/L (ref 0.4–1.9)
LACTATE BLDV-SCNC: 2.8 MMOL/L (ref 0.4–1.9)
LEUKOCYTE ESTERASE UR QL STRIP.AUTO: NEGATIVE
LIPASE SERPL-CCNC: 48 U/L (ref 13–60)
LYMPHOCYTES # BLD: 1 K/UL (ref 1–5.1)
LYMPHOCYTES NFR BLD: 16 %
MCH RBC QN AUTO: 30 PG (ref 26–34)
MCHC RBC AUTO-ENTMCNC: 31.6 G/DL (ref 31–36)
MCV RBC AUTO: 95 FL (ref 80–100)
METHADONE UR QL SCN>300 NG/ML: NORMAL
METHGB MFR BLDV: 0.3 %
MONOCYTES # BLD: 0.2 K/UL (ref 0–1.3)
MONOCYTES NFR BLD: 3 %
NEUTROPHILS # BLD: 5.2 K/UL (ref 1.7–7.7)
NEUTROPHILS NFR BLD: 81 %
NITRITE UR QL STRIP.AUTO: NEGATIVE
O2 CT VFR BLDV CALC: 11 VOL %
O2 THERAPY: ABNORMAL
OPIATES UR QL SCN>300 NG/ML: NORMAL
OXYCODONE UR QL SCN: NORMAL
PCO2 BLDV: 62.1 MMHG (ref 40–50)
PCP UR QL SCN>25 NG/ML: NORMAL
PERFORMED ON: ABNORMAL
PERFORMED ON: ABNORMAL
PERFORMED ON: NORMAL
PERFORMED ON: NORMAL
PH BLDV: 7.3 [PH] (ref 7.35–7.45)
PH UR STRIP.AUTO: 5.5 [PH] (ref 5–8)
PH UR STRIP: 5.5 [PH]
PLATELET # BLD AUTO: 406 K/UL (ref 135–450)
PLATELET BLD QL SMEAR: ABNORMAL
PMV BLD AUTO: 8.5 FL (ref 5–10.5)
PO2 BLDV: 37.6 MMHG (ref 25–40)
POTASSIUM SERPL-SCNC: 3.7 MMOL/L (ref 3.5–5.1)
PROT SERPL-MCNC: 8.4 G/DL (ref 6.4–8.2)
PROT UR STRIP.AUTO-MCNC: ABNORMAL MG/DL
PROTHROMBIN TIME: 14 SEC (ref 11.9–14.9)
RBC # BLD AUTO: 5.17 M/UL (ref 4–5.2)
RBC CLUMPS #/AREA URNS AUTO: 0 /HPF (ref 0–4)
RBC MORPH BLD: NORMAL
SAO2 % BLDV: 64 %
SARS-COV-2 RNA RESP QL NAA+PROBE: NOT DETECTED
SLIDE REVIEW: ABNORMAL
SODIUM SERPL-SCNC: 156 MMOL/L (ref 136–145)
SP GR UR STRIP.AUTO: >=1.03 (ref 1–1.03)
TROPONIN, HIGH SENSITIVITY: 19 NG/L (ref 0–14)
TROPONIN, HIGH SENSITIVITY: 20 NG/L (ref 0–14)
UA COMPLETE W REFLEX CULTURE PNL UR: ABNORMAL
UA DIPSTICK W REFLEX MICRO PNL UR: YES
URN SPEC COLLECT METH UR: ABNORMAL
UROBILINOGEN UR STRIP-ACNC: 1 E.U./DL
WBC # BLD AUTO: 6.4 K/UL (ref 4–11)
WBC #/AREA URNS AUTO: 1 /HPF (ref 0–5)

## 2025-03-29 PROCEDURE — 81001 URINALYSIS AUTO W/SCOPE: CPT

## 2025-03-29 PROCEDURE — 2580000003 HC RX 258: Performed by: EMERGENCY MEDICINE

## 2025-03-29 PROCEDURE — 2500000003 HC RX 250 WO HCPCS: Performed by: EMERGENCY MEDICINE

## 2025-03-29 PROCEDURE — 80053 COMPREHEN METABOLIC PANEL: CPT

## 2025-03-29 PROCEDURE — 70450 CT HEAD/BRAIN W/O DYE: CPT

## 2025-03-29 PROCEDURE — 87636 SARSCOV2 & INF A&B AMP PRB: CPT

## 2025-03-29 PROCEDURE — 74176 CT ABD & PELVIS W/O CONTRAST: CPT

## 2025-03-29 PROCEDURE — 83690 ASSAY OF LIPASE: CPT

## 2025-03-29 PROCEDURE — 93005 ELECTROCARDIOGRAM TRACING: CPT | Performed by: EMERGENCY MEDICINE

## 2025-03-29 PROCEDURE — 36415 COLL VENOUS BLD VENIPUNCTURE: CPT

## 2025-03-29 PROCEDURE — 2580000003 HC RX 258: Performed by: STUDENT IN AN ORGANIZED HEALTH CARE EDUCATION/TRAINING PROGRAM

## 2025-03-29 PROCEDURE — 87449 NOS EACH ORGANISM AG IA: CPT

## 2025-03-29 PROCEDURE — 85610 PROTHROMBIN TIME: CPT

## 2025-03-29 PROCEDURE — 6360000002 HC RX W HCPCS: Performed by: EMERGENCY MEDICINE

## 2025-03-29 PROCEDURE — 82077 ASSAY SPEC XCP UR&BREATH IA: CPT

## 2025-03-29 PROCEDURE — 82803 BLOOD GASES ANY COMBINATION: CPT

## 2025-03-29 PROCEDURE — 2580000003 HC RX 258: Performed by: INTERNAL MEDICINE

## 2025-03-29 PROCEDURE — 1200000000 HC SEMI PRIVATE

## 2025-03-29 PROCEDURE — 85025 COMPLETE CBC W/AUTO DIFF WBC: CPT

## 2025-03-29 PROCEDURE — 6370000000 HC RX 637 (ALT 250 FOR IP): Performed by: INTERNAL MEDICINE

## 2025-03-29 PROCEDURE — 84484 ASSAY OF TROPONIN QUANT: CPT

## 2025-03-29 PROCEDURE — 83036 HEMOGLOBIN GLYCOSYLATED A1C: CPT

## 2025-03-29 PROCEDURE — 87040 BLOOD CULTURE FOR BACTERIA: CPT

## 2025-03-29 PROCEDURE — 83605 ASSAY OF LACTIC ACID: CPT

## 2025-03-29 PROCEDURE — 93010 ELECTROCARDIOGRAM REPORT: CPT | Performed by: INTERNAL MEDICINE

## 2025-03-29 PROCEDURE — 99285 EMERGENCY DEPT VISIT HI MDM: CPT

## 2025-03-29 PROCEDURE — 2500000003 HC RX 250 WO HCPCS: Performed by: INTERNAL MEDICINE

## 2025-03-29 PROCEDURE — 71250 CT THORAX DX C-: CPT

## 2025-03-29 PROCEDURE — 82140 ASSAY OF AMMONIA: CPT

## 2025-03-29 PROCEDURE — 80307 DRUG TEST PRSMV CHEM ANLYZR: CPT

## 2025-03-29 PROCEDURE — 6360000002 HC RX W HCPCS: Performed by: INTERNAL MEDICINE

## 2025-03-29 RX ORDER — GLUCAGON 1 MG/ML
1 KIT INJECTION PRN
Status: DISCONTINUED | OUTPATIENT
Start: 2025-03-29 | End: 2025-04-02 | Stop reason: HOSPADM

## 2025-03-29 RX ORDER — PANTOPRAZOLE SODIUM 40 MG/1
40 TABLET, DELAYED RELEASE ORAL
Status: DISCONTINUED | OUTPATIENT
Start: 2025-03-30 | End: 2025-04-01

## 2025-03-29 RX ORDER — INSULIN LISPRO 100 [IU]/ML
0-8 INJECTION, SOLUTION INTRAVENOUS; SUBCUTANEOUS
Status: DISCONTINUED | OUTPATIENT
Start: 2025-03-29 | End: 2025-04-02 | Stop reason: HOSPADM

## 2025-03-29 RX ORDER — IPRATROPIUM BROMIDE AND ALBUTEROL SULFATE 2.5; .5 MG/3ML; MG/3ML
1 SOLUTION RESPIRATORY (INHALATION) EVERY 4 HOURS PRN
Status: DISCONTINUED | OUTPATIENT
Start: 2025-03-29 | End: 2025-04-02 | Stop reason: HOSPADM

## 2025-03-29 RX ORDER — ONDANSETRON 2 MG/ML
4 INJECTION INTRAMUSCULAR; INTRAVENOUS EVERY 6 HOURS PRN
Status: DISCONTINUED | OUTPATIENT
Start: 2025-03-29 | End: 2025-04-02 | Stop reason: HOSPADM

## 2025-03-29 RX ORDER — ACETAMINOPHEN 325 MG/1
650 TABLET ORAL EVERY 6 HOURS PRN
Status: DISCONTINUED | OUTPATIENT
Start: 2025-03-29 | End: 2025-04-02 | Stop reason: HOSPADM

## 2025-03-29 RX ORDER — DEXTROSE MONOHYDRATE 50 MG/ML
INJECTION, SOLUTION INTRAVENOUS CONTINUOUS
Status: DISCONTINUED | OUTPATIENT
Start: 2025-03-29 | End: 2025-04-01

## 2025-03-29 RX ORDER — ASPIRIN 81 MG/1
81 TABLET ORAL DAILY
Status: DISCONTINUED | OUTPATIENT
Start: 2025-03-29 | End: 2025-04-02 | Stop reason: HOSPADM

## 2025-03-29 RX ORDER — HYDRALAZINE HYDROCHLORIDE 20 MG/ML
10 INJECTION INTRAMUSCULAR; INTRAVENOUS EVERY 6 HOURS PRN
Status: DISCONTINUED | OUTPATIENT
Start: 2025-03-29 | End: 2025-04-02 | Stop reason: HOSPADM

## 2025-03-29 RX ORDER — SODIUM CHLORIDE, SODIUM LACTATE, POTASSIUM CHLORIDE, AND CALCIUM CHLORIDE .6; .31; .03; .02 G/100ML; G/100ML; G/100ML; G/100ML
30 INJECTION, SOLUTION INTRAVENOUS ONCE
Status: COMPLETED | OUTPATIENT
Start: 2025-03-29 | End: 2025-03-29

## 2025-03-29 RX ORDER — 0.9 % SODIUM CHLORIDE 0.9 %
500 INTRAVENOUS SOLUTION INTRAVENOUS PRN
Status: DISCONTINUED | OUTPATIENT
Start: 2025-03-29 | End: 2025-04-02 | Stop reason: HOSPADM

## 2025-03-29 RX ORDER — LISINOPRIL 5 MG/1
2.5 TABLET ORAL DAILY
Status: DISCONTINUED | OUTPATIENT
Start: 2025-03-29 | End: 2025-04-02 | Stop reason: HOSPADM

## 2025-03-29 RX ORDER — ACETAMINOPHEN 650 MG/1
650 SUPPOSITORY RECTAL EVERY 6 HOURS PRN
Status: DISCONTINUED | OUTPATIENT
Start: 2025-03-29 | End: 2025-04-02 | Stop reason: HOSPADM

## 2025-03-29 RX ORDER — IPRATROPIUM BROMIDE AND ALBUTEROL SULFATE 2.5; .5 MG/3ML; MG/3ML
1 SOLUTION RESPIRATORY (INHALATION)
Status: DISCONTINUED | OUTPATIENT
Start: 2025-03-29 | End: 2025-03-29

## 2025-03-29 RX ORDER — DEXTROSE MONOHYDRATE 100 MG/ML
INJECTION, SOLUTION INTRAVENOUS CONTINUOUS PRN
Status: DISCONTINUED | OUTPATIENT
Start: 2025-03-29 | End: 2025-04-02 | Stop reason: HOSPADM

## 2025-03-29 RX ORDER — SODIUM CHLORIDE 9 MG/ML
INJECTION, SOLUTION INTRAVENOUS CONTINUOUS
Status: DISCONTINUED | OUTPATIENT
Start: 2025-03-29 | End: 2025-03-29

## 2025-03-29 RX ORDER — POTASSIUM CHLORIDE 7.45 MG/ML
10 INJECTION INTRAVENOUS PRN
Status: DISCONTINUED | OUTPATIENT
Start: 2025-03-29 | End: 2025-04-02 | Stop reason: HOSPADM

## 2025-03-29 RX ORDER — SODIUM CHLORIDE 9 MG/ML
INJECTION, SOLUTION INTRAVENOUS PRN
Status: DISCONTINUED | OUTPATIENT
Start: 2025-03-29 | End: 2025-04-02 | Stop reason: HOSPADM

## 2025-03-29 RX ORDER — AZITHROMYCIN 250 MG/1
500 TABLET, FILM COATED ORAL EVERY 24 HOURS
Status: DISCONTINUED | OUTPATIENT
Start: 2025-03-30 | End: 2025-03-30

## 2025-03-29 RX ORDER — POTASSIUM CHLORIDE 1500 MG/1
40 TABLET, EXTENDED RELEASE ORAL PRN
Status: DISCONTINUED | OUTPATIENT
Start: 2025-03-29 | End: 2025-04-02 | Stop reason: HOSPADM

## 2025-03-29 RX ORDER — RISPERIDONE 0.5 MG/1
0.25 TABLET ORAL NIGHTLY
Status: DISCONTINUED | OUTPATIENT
Start: 2025-03-29 | End: 2025-03-29

## 2025-03-29 RX ORDER — ONDANSETRON 4 MG/1
4 TABLET, ORALLY DISINTEGRATING ORAL EVERY 8 HOURS PRN
Status: DISCONTINUED | OUTPATIENT
Start: 2025-03-29 | End: 2025-04-02 | Stop reason: HOSPADM

## 2025-03-29 RX ORDER — SODIUM CHLORIDE 0.9 % (FLUSH) 0.9 %
5-40 SYRINGE (ML) INJECTION EVERY 12 HOURS SCHEDULED
Status: DISCONTINUED | OUTPATIENT
Start: 2025-03-29 | End: 2025-04-02 | Stop reason: HOSPADM

## 2025-03-29 RX ORDER — ENOXAPARIN SODIUM 100 MG/ML
40 INJECTION SUBCUTANEOUS DAILY
Status: DISCONTINUED | OUTPATIENT
Start: 2025-03-29 | End: 2025-04-02 | Stop reason: HOSPADM

## 2025-03-29 RX ORDER — FLUTICASONE PROPIONATE 50 MCG
1 SPRAY, SUSPENSION (ML) NASAL DAILY PRN
Status: DISCONTINUED | OUTPATIENT
Start: 2025-03-29 | End: 2025-04-02 | Stop reason: HOSPADM

## 2025-03-29 RX ORDER — SODIUM CHLORIDE 0.9 % (FLUSH) 0.9 %
10 SYRINGE (ML) INJECTION PRN
Status: DISCONTINUED | OUTPATIENT
Start: 2025-03-29 | End: 2025-04-02 | Stop reason: HOSPADM

## 2025-03-29 RX ADMIN — SODIUM CHLORIDE: 0.9 INJECTION, SOLUTION INTRAVENOUS at 12:50

## 2025-03-29 RX ADMIN — SODIUM CHLORIDE, POTASSIUM CHLORIDE, SODIUM LACTATE AND CALCIUM CHLORIDE 1905 ML: 600; 310; 30; 20 INJECTION, SOLUTION INTRAVENOUS at 07:01

## 2025-03-29 RX ADMIN — ENOXAPARIN SODIUM 40 MG: 100 INJECTION SUBCUTANEOUS at 12:46

## 2025-03-29 RX ADMIN — AZITHROMYCIN MONOHYDRATE 500 MG: 500 INJECTION, POWDER, LYOPHILIZED, FOR SOLUTION INTRAVENOUS at 10:18

## 2025-03-29 RX ADMIN — SODIUM CHLORIDE, PRESERVATIVE FREE 10 ML: 5 INJECTION INTRAVENOUS at 12:48

## 2025-03-29 RX ADMIN — LISINOPRIL 2.5 MG: 5 TABLET ORAL at 18:11

## 2025-03-29 RX ADMIN — WATER 1000 MG: 1 INJECTION INTRAMUSCULAR; INTRAVENOUS; SUBCUTANEOUS at 10:16

## 2025-03-29 RX ADMIN — INSULIN LISPRO 2 UNITS: 100 INJECTION, SOLUTION INTRAVENOUS; SUBCUTANEOUS at 12:45

## 2025-03-29 RX ADMIN — SODIUM CHLORIDE, PRESERVATIVE FREE 10 ML: 5 INJECTION INTRAVENOUS at 20:15

## 2025-03-29 RX ADMIN — ASPIRIN 81 MG: 81 TABLET, COATED ORAL at 18:11

## 2025-03-29 RX ADMIN — DEXTROSE MONOHYDRATE: 50 INJECTION, SOLUTION INTRAVENOUS at 18:09

## 2025-03-29 ASSESSMENT — PAIN SCALES - WONG BAKER
WONGBAKER_NUMERICALRESPONSE: NO HURT

## 2025-03-29 ASSESSMENT — LIFESTYLE VARIABLES
HOW OFTEN DO YOU HAVE A DRINK CONTAINING ALCOHOL: NEVER
HOW MANY STANDARD DRINKS CONTAINING ALCOHOL DO YOU HAVE ON A TYPICAL DAY: PATIENT DOES NOT DRINK

## 2025-03-29 ASSESSMENT — PAIN - FUNCTIONAL ASSESSMENT: PAIN_FUNCTIONAL_ASSESSMENT: WONG-BAKER FACES

## 2025-03-29 NOTE — PROGRESS NOTES
4 Eyes Skin Assessment     NAME:  Jose A Hoover  YOB: 1965  MEDICAL RECORD NUMBER:  [id number]    The patient is being assessed for  Admission    I agree that at least one RN has performed a thorough Head to Toe Skin Assessment on the patient. ALL assessment sites listed below have been assessed.      Areas assessed by both nurses:    Head, Face, Ears, all areas of skin assessed        Does the Patient have a Wound? Yes wound(s) were present on assessment. LDA wound assessment was Initiated and completed by RN       Theodore Prevention initiated by RN: Yes  Wound Care Orders initiated by RN: Yes    Pressure Injury (Stage 3,4, Unstageable, DTI, NWPT, and Complex wounds) if present, place Wound referral order by RN under : Yes    New Ostomies, if present place, Ostomy referral order under : No     Nurse 1 eSignature: Electronically signed by Ammon Reza RN on 3/29/25 at 2:29 PM EDT    **SHARE this note so that the co-signing nurse can place an eSignature**    Nurse 2 eSignature: Electronically signed by Jessica García RN on 3/29/25 at 2:43 PM EDT

## 2025-03-29 NOTE — PLAN OF CARE
Problem: Chronic Conditions and Co-morbidities  Goal: Patient's chronic conditions and co-morbidity symptoms are monitored and maintained or improved  3/29/2025 1623 by Ammon Reza RN  Outcome: Progressing  3/29/2025 1622 by Ammon Reza RN  Outcome: Progressing     Problem: Pain  Goal: Verbalizes/displays adequate comfort level or baseline comfort level  3/29/2025 1623 by Ammon Reza RN  Outcome: Progressing  3/29/2025 1622 by Ammon Reza RN  Outcome: Progressing     Problem: Skin/Tissue Integrity  Goal: Skin integrity remains intact  Description: 1.  Monitor for areas of redness and/or skin breakdown  2.  Assess vascular access sites hourly  3.  Every 4-6 hours minimum:  Change oxygen saturation probe site  4.  Every 4-6 hours:  If on nasal continuous positive airway pressure, respiratory therapy assess nares and determine need for appliance change or resting period  3/29/2025 1623 by Ammon Reza RN  Outcome: Progressing  3/29/2025 1622 by Ammon Reza RN  Outcome: Progressing     Problem: ABCDS Injury Assessment  Goal: Absence of physical injury  3/29/2025 1623 by Ammon Reza RN  Outcome: Progressing  3/29/2025 1622 by Ammon Reza RN  Outcome: Progressing

## 2025-03-29 NOTE — PROGRESS NOTES
Patient has arrived to unit , Vitals obtained. Patient is lethargic, and nonverbal.Respirations are easy and unlabored. call light with in reach, fall precaution place.

## 2025-03-29 NOTE — PROGRESS NOTES
Aspiration Screen    Name: Jose A Hoover  : 1965  Medical Diagnosis: Dehydration [E86.0]  Hypernatremia [E87.0]  Altered mental status, unspecified altered mental status type [R41.82]  Sepsis due to pneumonia (HCC) [J18.9, A41.9]  Multifocal pneumonia [J18.9]    Swallow screen to rule out aspiration completed per pneumonia protocol. Patient demonstrates some high risk indicators for potential dysphagia / aspiration per swallow screen.  RECOMMEND: Clinical Swallow Evaluation at bedside to assess swallowing function, rule out aspiration, and determine appropriate diet level.     Linda Chung MS, CCC-SLP, CBIS  Speech-Language Pathologist

## 2025-03-29 NOTE — ED PROVIDER NOTES
[facility] Emergency Department      Pt Name: Jose A Hoover  MRN: [id number]  Birthdate 1965  Date of evaluation: 3/29/2025  Provider: HILDA CALLAWAY MD  CHIEF COMPLAINT  Chief Complaint   Patient presents with    Altered Mental Status     Pt. From [facility] with f staff stating a slow decline x 1 week. Pt. Not answering any questions just stares ahead. Pt. Arrived by [facility] squad.      HPI  Jose A Hoover is a 59 y.o. female who presents because of concern for altered mental status.  She has a history of dementia and lives at [facility].  She has been noted by staff to have a decline over the past week.  She is altered from her baseline.  She is not able to provide any history and does not communicate with me.    REVIEW OF SYSTEMS:  See HPI for further details. Remainder of review of systems limited by patient ability to provide history.  Nursing notes reviewed.    PAST MEDICAL HISTORY  Past Medical History:   Diagnosis Date    Abnormal EKG     Depression     Diabetes mellitus (HCC)     GERD (gastroesophageal reflux disease)     Glossodynia     Hyperlipidemia     Hypertension      SURGICAL HISTORY  Past Surgical History:   Procedure Laterality Date    CHOLECYSTECTOMY      FINGER SURGERY       MEDICATIONS:  No current facility-administered medications on file prior to encounter.     Current Outpatient Medications on File Prior to Encounter   Medication Sig Dispense Refill    risperiDONE (RISPERDAL) 0.25 MG tablet Take 1 tablet by mouth nightly 60 tablet 3    aspirin 81 MG EC tablet Take 1 tablet by mouth daily 30 tablet 0    gabapentin (NEURONTIN) 100 MG capsule Take 1 capsule by mouth 3 times daily for 30 days. 90 capsule 0    insulin glargine (LANTUS;BASAGLAR) 100 UNIT/ML injection pen Inject 16 Units into the skin 2 times daily 9.6 mL 0    insulin lispro, 1 Unit Dial, 100 UNIT/ML SOPN Inject 0-12 Units into the skin 3 times daily (with meals) **Medium Dose Corrective  Algorithm**  Glucose: Dose:  If <139             No Insulin  140-199 2 Units  200-249 4 Units  250-299 6 Units  300-349 8 Units  350-400 10 Units  Above 400       12 Units 10.8 mL 0    lisinopril (PRINIVIL;ZESTRIL) 2.5 MG tablet Take 1 tablet by mouth daily 30 tablet 0    pantoprazole (PROTONIX) 40 MG tablet Take 1 tablet by mouth every morning (before breakfast) 15 tablet 0    MULTIPLE VITAMINS-MINERALS ER PO Take by mouth      Vitamin E 400 units TABS Take by mouth      blood glucose test strips (ASCENSIA AUTODISC VI;ONE TOUCH ULTRA TEST VI) strip Check blood  Sugars once a day.      fluticasone (FLONASE) 50 MCG/ACT nasal spray 1 spray by Each Nare route daily 1 Bottle 0     ALLERGIES  Patient has no known allergies.  FAMILY HISTORY:  History reviewed. No pertinent family history.  SOCIAL HISTORY:  Social History     Tobacco Use    Smoking status: Never    Smokeless tobacco: Never   Substance Use Topics    Alcohol use: No    Drug use: No     IMMUNIZATIONS:  Noncontributory    PHYSICAL EXAM  VITAL SIGNS:  Blood pressure 138/83, pulse (!) 121, temperature 98.5 °F (36.9 °C), temperature source Oral, resp. rate 15, weight 63.5 kg (139 lb 14.4 oz), SpO2 95%.  Constitutional:  59 y.o. female eyes open but does not communicate, mumbles to herself  HENT:  Atraumatic, mucous membranes cry  Eyes:  Conjunctiva clear, no discharge, no icterus  Neck:  Without masses, no adenopathy, supple  Cardiovascular:  Regular, no rubs, tachycardic  Thorax & Lungs:  No accessory muscle usage, clear  Abdomen:  Soft, non distended, bowel sounds present, NT  Back:  No deformity  Genitalia:  Deferred  Rectal:  Deferred  Extremities:  No cyanosis, no edema  Skin:  Exposed areas of skin warm, dry  Neurologic: Eyes open, pupils are equal, she does not follow any commands, NIHSS cannot be performed due to mental status, she does seem to spontaneously move extremities, no facial droop  Psychiatric:  Not assessable    RESULTS / MEDICAL DECISION

## 2025-03-29 NOTE — ED NOTES
Patient Name: Jose A Hoover  : 1965 59 y.o.  MRN: [id number]  ED Room #: ED-0001     Chief complaint:   Chief Complaint   Patient presents with    Altered Mental Status     Pt. From [facility] with ecf staff stating a slow decline x 1 week. Pt. Not answering any questions just stares ahead. Pt. Arrived by [facility] squ.      Hospital Problem/Diagnosis:   Hospital Problems           Last Modified POA    * (Principal) Pneumonia 3/29/2025 Yes    Diabetes mellitus (HCC) 3/29/2025 Yes    HTN (hypertension), benign 3/29/2025 Yes    Hypernatremia 3/29/2025 Yes    Sepsis (HCC) 3/29/2025 Yes    Sepsis due to pneumonia (HCC) 3/29/2025 Yes         O2 Flow Rate:O2 Device: Nasal cannula O2 Flow Rate (L/min): 2 L/min (if applicable)  Cardiac Rhythm:   (if applicable)  Active LDA's:   Peripheral IV 25 Right Forearm (Active)            How does patient ambulate? Unknown, did not assess in the Emergency Department    2. How does patient take pills? Unknown, no oral medications were given in the Emergency Department    3. Is patient alert? Doesn't respond to voice but responds to painful stimuli    4. Is patient oriented? Confused    5.   Patient arrived from:  nursing home  Facility Name: [facility]    6. If patient is disoriented or from a [facility] has family been notified of admission? No    7. Patient belongings? Belongings: none    Disposition of belongings? No Belongings     8. Any specific patient or family belongings/needs/dynamics?   a. none    9. Miscellaneous comments/pending orders?  a. None        If there are any additional questions please reach out to the Emergency Department.

## 2025-03-29 NOTE — PROGRESS NOTES
Pt admitted for pna    Full h+p to follow    Active Hospital Problems    Diagnosis Date Noted    Pneumonia [J18.9] 03/29/2025    Hypernatremia [E87.0] 03/29/2025    Sepsis (HCC) [A41.9] 03/29/2025    Diabetes mellitus (HCC) [E11.9] 08/05/2019    HTN (hypertension), benign [I10] 08/10/2017       Please use [employer] to contact me with any questions during the day.   The hospitalist service will provide cross-coverage for this patient from 7pm to 7am.    During those hours, contact the on-call hospitalist MD/TOÑO for questions.

## 2025-03-29 NOTE — H&P
History and Physical  Dr. Hawkins  3/29/2025    PCP: Mireille Wagner MD    Cc:   Chief Complaint   Patient presents with    Altered Mental Status     Pt. From [facility] with ecf staff stating a slow decline x 1 week. Pt. Not answering any questions just stares ahead. Pt. Arrived by [facility] squad.        HPI:  Jose A Hoover is a 59 y.o. female who has a past medical history of Abnormal EKG, Depression, Diabetes mellitus (HCC), GERD (gastroesophageal reflux disease), Glossodynia, Hyperlipidemia, and Hypertension.     Patient presents with Pneumonia.  HPI  (1-3 for expanded problem focused, >=4 for detailed/comprehensive)      59 y.o. female who presents because of concern for altered mental status.  She has a history of dementia and lives at [facility].  She has been noted by staff to have a decline over the past week.  She is altered from her baseline.  She is not able to provide any history and does not communicate.    Labs show pneumonia, likely sepsis  Na is extremely elevated as well         Problem list of hospitalization thus far:  Active Hospital Problems    Diagnosis     Pneumonia [J18.9]     Hypernatremia [E87.0]     Sepsis (HCC) [A41.9]     Diabetes mellitus (HCC) [E11.9]     HTN (hypertension), benign [I10]          Review of Systems: (1 system for EPF, 2-9 for detailed, 10+ for comprehensive)  Cannot obtain from pt    Past Medical History:   Past Medical History:   Diagnosis Date    Abnormal EKG     Depression     Diabetes mellitus (HCC)     GERD (gastroesophageal reflux disease)     Glossodynia     Hyperlipidemia     Hypertension        Past Surgical History:   Past Surgical History:   Procedure Laterality Date    CHOLECYSTECTOMY      FINGER SURGERY         Social History:   Social History       Tobacco History       Smoking Status  Never      Smokeless Tobacco Use  Never              Alcohol History       Alcohol Use Status  No              Drug Use       Drug Use Status  No          acute abnormality. SOFT TISSUES/SKULL:  No acute abnormality of the visualized skull or soft tissues.     No acute intracranial abnormality.         EKG:   EKG interpreted by self - it shows sinus tach at 121  Old chart reviewed, EKG dated 6/4/21 is reviewed, there is  difference noted.    Old study shows sinus at 92    Lab Results   Component Value Date/Time    GLUCOSE 380 03/29/2025 06:37 AM     Lab Results   Component Value Date/Time    POCGLU 261 06/08/2021 05:02 PM     /81   Pulse (!) 109   Temp 98.5 °F (36.9 °C) (Oral)   Resp 17   Wt 63.5 kg (139 lb 14.4 oz)   SpO2 100% Comment: Simultaneous filing. User may not have seen previous data.  BMI 24.78 kg/m²     MEDICAL DECISION MAKING:    Principal Problem:    Pneumonia -New Problem to me.  Pt with pneumonia on imaging from ER. Swabs positive for flu  Plan: admit, tamiflu. Empiric abx as well  Active Problems:    Diabetes mellitus (HCC)  Plan: Patient placed on controlled carbohydrate diet. Fingerstick sugars to be checked to monitor for both hypoglycemia as well as hyperglycemia.  Sliding scale insulin ordered.  Glucagon and dextrose ordered for hypoglycemia.  Patient will be continued on home medications. Hemoglobin a1c to be ordered to assess efficacy of therapy.     HTN (hypertension), benign -Established problem. Stable.  136/81  Plan: Pt home BP meds reviewed and will be continued. IV Hydralazine ordered for control of extremely high blood pressures.   Will monitor labs to assess Creat/K for possible complications of medications.     Hypernatremia -Established problem. Stable.  Na 152  Plan: admit, fluids, consult renal    Sepsis (HCC)  Plan: admit, fluids. Tx underlying infection          Diagnoses as listed above, designated as new or established and plan outlined for each.      Case discussed with: guanaco, ER    MDM Determination    PROBLEM  High: life threatening unstable chronic illness (I.e. severe COPD, asthma) or acute illness (i.e. MI,

## 2025-03-29 NOTE — PROGRESS NOTES
03/29/25 1206   RT Protocol   History Pulmonary Disease 0   Respiratory pattern 0   Breath sounds 2   Cough 0   Bronchodilator Assessment Score 2

## 2025-03-30 ENCOUNTER — APPOINTMENT (OUTPATIENT)
Dept: GENERAL RADIOLOGY | Age: 60
DRG: 720 | End: 2025-03-30
Payer: MEDICAID

## 2025-03-30 PROBLEM — J11.1 INFLUENZA: Status: ACTIVE | Noted: 2025-03-30

## 2025-03-30 LAB
ANION GAP SERPL CALCULATED.3IONS-SCNC: 14 MMOL/L (ref 3–16)
BASOPHILS # BLD: 0 K/UL (ref 0–0.2)
BASOPHILS NFR BLD: 0.3 %
BUN SERPL-MCNC: 14 MG/DL (ref 7–20)
CALCIUM SERPL-MCNC: 9.3 MG/DL (ref 8.3–10.6)
CHLORIDE SERPL-SCNC: 111 MMOL/L (ref 99–110)
CO2 SERPL-SCNC: 27 MMOL/L (ref 21–32)
CREAT SERPL-MCNC: 0.6 MG/DL (ref 0.6–1.1)
DEPRECATED RDW RBC AUTO: 13.1 % (ref 12.4–15.4)
EOSINOPHIL # BLD: 0.1 K/UL (ref 0–0.6)
EOSINOPHIL NFR BLD: 1.5 %
EST. AVERAGE GLUCOSE BLD GHB EST-MCNC: 171.4 MG/DL
GFR SERPLBLD CREATININE-BSD FMLA CKD-EPI: >90 ML/MIN/{1.73_M2}
GLUCOSE BLD-MCNC: 140 MG/DL (ref 70–99)
GLUCOSE BLD-MCNC: 144 MG/DL (ref 70–99)
GLUCOSE BLD-MCNC: 145 MG/DL (ref 70–99)
GLUCOSE BLD-MCNC: 61 MG/DL (ref 70–99)
GLUCOSE BLD-MCNC: 71 MG/DL (ref 70–99)
GLUCOSE SERPL-MCNC: 85 MG/DL (ref 70–99)
HBA1C MFR BLD: 7.6 %
HCT VFR BLD AUTO: 40.2 % (ref 36–48)
HGB BLD-MCNC: 13 G/DL (ref 12–16)
LACTATE BLDV-SCNC: 2.1 MMOL/L (ref 0.4–2)
LYMPHOCYTES # BLD: 3.1 K/UL (ref 1–5.1)
LYMPHOCYTES NFR BLD: 44.1 %
MCH RBC QN AUTO: 30 PG (ref 26–34)
MCHC RBC AUTO-ENTMCNC: 32.4 G/DL (ref 31–36)
MCV RBC AUTO: 92.7 FL (ref 80–100)
MONOCYTES # BLD: 0.6 K/UL (ref 0–1.3)
MONOCYTES NFR BLD: 8 %
NEUTROPHILS # BLD: 3.2 K/UL (ref 1.7–7.7)
NEUTROPHILS NFR BLD: 46.1 %
PERFORMED ON: ABNORMAL
PERFORMED ON: NORMAL
PLATELET # BLD AUTO: 411 K/UL (ref 135–450)
PMV BLD AUTO: 7.7 FL (ref 5–10.5)
POTASSIUM SERPL-SCNC: 3.4 MMOL/L (ref 3.5–5.1)
PROCALCITONIN SERPL IA-MCNC: 0.08 NG/ML (ref 0–0.15)
RBC # BLD AUTO: 4.33 M/UL (ref 4–5.2)
SODIUM SERPL-SCNC: 152 MMOL/L (ref 136–145)
WBC # BLD AUTO: 7 K/UL (ref 4–11)

## 2025-03-30 PROCEDURE — 85025 COMPLETE CBC W/AUTO DIFF WBC: CPT

## 2025-03-30 PROCEDURE — 1200000000 HC SEMI PRIVATE

## 2025-03-30 PROCEDURE — 84145 PROCALCITONIN (PCT): CPT

## 2025-03-30 PROCEDURE — 71045 X-RAY EXAM CHEST 1 VIEW: CPT

## 2025-03-30 PROCEDURE — 2580000003 HC RX 258: Performed by: INTERNAL MEDICINE

## 2025-03-30 PROCEDURE — 36415 COLL VENOUS BLD VENIPUNCTURE: CPT

## 2025-03-30 PROCEDURE — 83605 ASSAY OF LACTIC ACID: CPT

## 2025-03-30 PROCEDURE — 80048 BASIC METABOLIC PNL TOTAL CA: CPT

## 2025-03-30 PROCEDURE — 2580000003 HC RX 258: Performed by: STUDENT IN AN ORGANIZED HEALTH CARE EDUCATION/TRAINING PROGRAM

## 2025-03-30 PROCEDURE — 6360000002 HC RX W HCPCS: Performed by: INTERNAL MEDICINE

## 2025-03-30 PROCEDURE — 2500000003 HC RX 250 WO HCPCS: Performed by: INTERNAL MEDICINE

## 2025-03-30 PROCEDURE — 6370000000 HC RX 637 (ALT 250 FOR IP): Performed by: INTERNAL MEDICINE

## 2025-03-30 RX ORDER — OSELTAMIVIR PHOSPHATE 75 MG/1
75 CAPSULE ORAL 2 TIMES DAILY
Status: DISCONTINUED | OUTPATIENT
Start: 2025-03-30 | End: 2025-04-02 | Stop reason: HOSPADM

## 2025-03-30 RX ADMIN — DEXTROSE MONOHYDRATE: 50 INJECTION, SOLUTION INTRAVENOUS at 20:50

## 2025-03-30 RX ADMIN — SODIUM CHLORIDE, PRESERVATIVE FREE 10 ML: 5 INJECTION INTRAVENOUS at 11:32

## 2025-03-30 RX ADMIN — ASPIRIN 81 MG: 81 TABLET, COATED ORAL at 11:31

## 2025-03-30 RX ADMIN — DEXTROSE 125 ML: 10 SOLUTION INTRAVENOUS at 07:46

## 2025-03-30 RX ADMIN — LISINOPRIL 2.5 MG: 5 TABLET ORAL at 11:31

## 2025-03-30 RX ADMIN — OSELTAMIVIR PHOSPHATE 75 MG: 75 CAPSULE ORAL at 20:54

## 2025-03-30 RX ADMIN — OSELTAMIVIR PHOSPHATE 75 MG: 75 CAPSULE ORAL at 11:31

## 2025-03-30 RX ADMIN — DEXTROSE MONOHYDRATE: 50 INJECTION, SOLUTION INTRAVENOUS at 07:46

## 2025-03-30 RX ADMIN — PANTOPRAZOLE SODIUM 40 MG: 40 TABLET, DELAYED RELEASE ORAL at 11:31

## 2025-03-30 RX ADMIN — ENOXAPARIN SODIUM 40 MG: 100 INJECTION SUBCUTANEOUS at 11:33

## 2025-03-30 ASSESSMENT — PAIN SCALES - WONG BAKER: WONGBAKER_NUMERICALRESPONSE: NO HURT

## 2025-03-30 NOTE — PROGRESS NOTES
Shift assessment completed, see flowsheets.  Medications administered, see MAR. Vital signs stable. Plan of care discussed with patient. Fall precautions in place. Call light and bedside table within reach.  Pt denies further needs at this time.  Will continue to monitor.  Lizbet Francis RN

## 2025-03-30 NOTE — CONSULTS
MD Juan Causey MD Aldo Estella, DO                 Office: [phone]                      Fax: [phone]             [email or web address]                   NEPHROLOGY CONSULT INITIAL NOTE        IMPRESSION/RECOMMENDATIONS:      #hypernatremia  -suspect 2/2 dehydration  -sNa 156 on presentation, correct sNa ~159  -has likely had poor po intake  -started on D5W overnight.   -continue increased free water repletion. Monitor Sna goal sNa correction < 10 meq/24 hr.    #DM  -on insulin per primary  -improved    #HTN  Controlled on home lisinopril 2.5qd    #influenza A  -positive on 3/29/25    #encephalopathy  -likely metabolic   -monitor    Thank you for the consult.  We will follow this patient along the hospitalization.  Rufus Guzman DO     High complexity, at risk of impending organ failure needing  higher level of care/monitoring.   Time spent 39 minutes that included face-to-face meeting/discussion with patient, and treatment team (including primary/referring team and other consultants; included coordination of care with the treatment team; and review of patient's electronic medical records and ordering appropriates tests.             Reason for Consult:  hypernatremia  Requesting Physician/Provider:  Dr. Amadou Hawkins MD    CHIEF COMPLAINT:   altered mental status    History obtained from records and patient.    HISTORY OF PRESENT ILLNESS:                Jose A Hoover  is 59 y.o. y.o. female with significant past medical history of dementia, DM, HTN who presented with AMS. Patient lives at SNF and staff noticed decline in her mental status and referred to ED. In ED, labs Na 156, blood glucose 380. Nephrology consulted for hypernatremia.     Past Medical History:     has a past medical history of Abnormal EKG, Depression, Diabetes mellitus (HCC), GERD (gastroesophageal reflux disease), Glossodynia, Hyperlipidemia, and Hypertension.   Past Surgical History:     has a past surgical history that  includes Finger surgery and Cholecystectomy.   Current Medications:    Current Facility-Administered Medications: fluticasone (FLONASE) 50 MCG/ACT nasal spray 1 spray, 1 spray, Each Nostril, Daily PRN  aspirin EC tablet 81 mg, 81 mg, Oral, Daily  lisinopril (PRINIVIL;ZESTRIL) tablet 2.5 mg, 2.5 mg, Oral, Daily  pantoprazole (PROTONIX) tablet 40 mg, 40 mg, Oral, QAM AC  sodium chloride flush 0.9 % injection 5-40 mL, 5-40 mL, IntraVENous, 2 times per day  sodium chloride flush 0.9 % injection 10 mL, 10 mL, IntraVENous, PRN  0.9 % sodium chloride infusion, , IntraVENous, PRN  enoxaparin (LOVENOX) injection 40 mg, 40 mg, SubCUTAneous, Daily  ondansetron (ZOFRAN-ODT) disintegrating tablet 4 mg, 4 mg, Oral, Q8H PRN **OR** ondansetron (ZOFRAN) injection 4 mg, 4 mg, IntraVENous, Q6H PRN  magnesium hydroxide (MILK OF MAGNESIA) 400 MG/5ML suspension 30 mL, 30 mL, Oral, Daily PRN  acetaminophen (TYLENOL) tablet 650 mg, 650 mg, Oral, Q6H PRN **OR** acetaminophen (TYLENOL) suppository 650 mg, 650 mg, Rectal, Q6H PRN  cefTRIAXone (ROCEPHIN) 1,000 mg in sterile water 10 mL IV syringe, 1,000 mg, IntraVENous, Q24H **AND** azithromycin (ZITHROMAX) tablet 500 mg, 500 mg, Oral, Q24H  hydrALAZINE (APRESOLINE) injection 10 mg, 10 mg, IntraVENous, Q6H PRN  sodium chloride 0.9 % bolus 500 mL, 500 mL, IntraVENous, PRN  potassium chloride (KLOR-CON M) extended release tablet 40 mEq, 40 mEq, Oral, PRN **OR** potassium bicarb-citric acid (EFFER-K) effervescent tablet 40 mEq, 40 mEq, Oral, PRN **OR** potassium chloride 10 mEq/100 mL IVPB (Peripheral Line), 10 mEq, IntraVENous, PRN  insulin lispro (HUMALOG,ADMELOG) injection vial 0-8 Units, 0-8 Units, SubCUTAneous, 4x Daily AC & HS  dextrose bolus 10% 125 mL, 125 mL, IntraVENous, PRN **OR** dextrose bolus 10% 250 mL, 250 mL, IntraVENous, PRN  glucagon injection 1 mg, 1 mg, SubCUTAneous, PRN  dextrose 10 % infusion, , IntraVENous, Continuous PRN  ipratropium 0.5 mg-albuterol 2.5 mg (DUONEB)

## 2025-03-30 NOTE — PROGRESS NOTES
Progress Note - Dr. Hawkins - Internal Medicine  PCP: Mireille Wagner  [facility] [place], [address] / [facility] [address] [phone]    Hospital Day: 1  Code Status: Full Code  Current Diet: ADULT DIET; Dysphagia - Pureed; 4 carb choices (60 gm/meal)        CC: follow up on medical issues    Subjective:   Jose A Hoover is a 59 y.o. female.    Pt seen and examined  Chart reviewed since last visit, labs and imaging below      Na a little better  ;lactic still up  On tamiflu, abx    Cannot obtain hx/ros from pt    Review of Systems: (1 system for EPF, 2-9 for detailed, 10+ for comprehensive)  Cannot get from pt    I have reviewed the patient's medical and social history in detail and updated the computerized patient record.  To recap: She  has a past medical history of Abnormal EKG, Depression, Diabetes mellitus (HCC), GERD (gastroesophageal reflux disease), Glossodynia, Hyperlipidemia, and Hypertension.. She  has a past surgical history that includes Finger surgery and Cholecystectomy.. She  reports that she has never smoked. She has never used smokeless tobacco. She reports that she does not drink alcohol and does not use drugs..        Active Hospital Problems    Diagnosis Date Noted    Influenza [J11.1] 03/30/2025    Pneumonia [J18.9] 03/29/2025    Hypernatremia [E87.0] 03/29/2025    Sepsis (HCC) [A41.9] 03/29/2025    Sepsis due to pneumonia (HCC) [J18.9, A41.9] 03/29/2025    Diabetes mellitus (HCC) [E11.9] 08/05/2019    HTN (hypertension), benign [I10] 08/10/2017       Current Facility-Administered Medications: oseltamivir (TAMIFLU) capsule 75 mg, 75 mg, Oral, BID  fluticasone (FLONASE) 50 MCG/ACT nasal spray 1 spray, 1 spray, Each Nostril, Daily PRN  aspirin EC tablet 81 mg, 81 mg, Oral, Daily  lisinopril (PRINIVIL;ZESTRIL) tablet 2.5 mg, 2.5 mg, Oral, Daily  pantoprazole (PROTONIX) tablet 40 mg, 40 mg, Oral, QAM AC  sodium chloride flush 0.9 % injection 5-40 mL, 5-40 mL, IntraVENous, 2 times per day  sodium  66.2 kg (145 lb 15.1 oz)   03/30/25 0436 (!) 119/92 98.2 °F (36.8 °C) Axillary (!) 101 17 -- -- --   03/29/25 2000 130/81 97.7 °F (36.5 °C) Axillary 91 16 98 % -- --   03/29/25 1653 130/86 97.2 °F (36.2 °C) Axillary (!) 102 16 -- -- --   03/29/25 1215 -- -- -- -- -- -- 1.575 m (5' 2\") --   03/29/25 1204 123/67 -- -- (!) 102 -- -- -- --   03/29/25 1156 123/67 97.8 °F (36.6 °C) Axillary (!) 102 14 100 % -- --   03/29/25 1045 132/82 -- -- (!) 107 17 98 % -- --   03/29/25 1030 (!) 123/100 -- -- (!) 104 15 -- -- --   03/29/25 1015 121/87 -- -- (!) 102 14 -- -- --     Patient Vitals for the past 96 hrs (Last 3 readings):   Weight   03/30/25 0443 66.2 kg (145 lb 15.1 oz)   03/29/25 0612 63.5 kg (139 lb 14.4 oz)           Intake/Output Summary (Last 24 hours) at 3/30/2025 1002  Last data filed at 3/29/2025 1807  Gross per 24 hour   Intake 0 ml   Output --   Net 0 ml         Physical Exam: (2-7 system for EPF/Detailed, >=8 for Comprehensive)  BP (!) 135/97   Pulse 98   Temp 98.7 °F (37.1 °C) (Oral)   Resp 16   Ht 1.575 m (5' 2\")   Wt 66.2 kg (145 lb 15.1 oz)   SpO2 98%   BMI 26.69 kg/m²   Constitutional: vitals as above: alert, appears stated age and cooperative    Head: Normocephalic, without obvious abnormality, atraumatic    Eyes:lids and lashes normal, conjunctivae and sclerae normal and pupils equal, round, reactive to light and accomodation    EMNT: external ears normal, nares midline    Neck: no carotid bruit, supple, symmetrical, trachea midline and thyroid not enlarged, symmetric, no tenderness/mass/nodules     Respiratory: crackles bilaterally with normal respiratory effort    Cardiovascular: normal rate, regular rhythm, normal S1 and S2 and no murmurs    Gastrointestinal: soft, non-tender, non-distended, normal bowel sounds, no masses or organomegaly    Extremities: no clubbing, no edema    Skin:No rashes or nodules noted.    Neurologic:negative         Labs:  Lab Results   Component Value Date    WBC 7.0

## 2025-03-30 NOTE — PLAN OF CARE
Problem: Chronic Conditions and Co-morbidities  Goal: Patient's chronic conditions and co-morbidity symptoms are monitored and maintained or improved  Outcome: Progressing     Problem: Pain  Goal: Verbalizes/displays adequate comfort level or baseline comfort level  Outcome: Progressing     Problem: Skin/Tissue Integrity  Goal: Skin integrity remains intact  Description: 1.  Monitor for areas of redness and/or skin breakdown  2.  Assess vascular access sites hourly  3.  Every 4-6 hours minimum:  Change oxygen saturation probe site  4.  Every 4-6 hours:  If on nasal continuous positive airway pressure, respiratory therapy assess nares and determine need for appliance change or resting period  Outcome: Progressing  Flowsheets (Taken 3/30/2025 7506)  Skin Integrity Remains Intact: Monitor for areas of redness and/or skin breakdown     Problem: ABCDS Injury Assessment  Goal: Absence of physical injury  Outcome: Progressing     Problem: Safety - Adult  Goal: Free from fall injury  Outcome: Progressing

## 2025-03-30 NOTE — PLAN OF CARE
Problem: Chronic Conditions and Co-morbidities  Goal: Patient's chronic conditions and co-morbidity symptoms are monitored and maintained or improved  3/29/2025 2148 by Margaret Ambrose RN  Outcome: Progressing  3/29/2025 1623 by Ammon Reza RN  Outcome: Progressing  3/29/2025 1622 by Ammon Reza RN  Outcome: Progressing     Problem: Pain  Goal: Verbalizes/displays adequate comfort level or baseline comfort level  3/29/2025 2148 by Margaret Ambrose RN  Outcome: Progressing  3/29/2025 1623 by Ammon Reza RN  Outcome: Progressing  3/29/2025 1622 by Ammon Reza RN  Outcome: Progressing     Problem: Skin/Tissue Integrity  Goal: Skin integrity remains intact  Description: 1.  Monitor for areas of redness and/or skin breakdown  2.  Assess vascular access sites hourly  3.  Every 4-6 hours minimum:  Change oxygen saturation probe site  4.  Every 4-6 hours:  If on nasal continuous positive airway pressure, respiratory therapy assess nares and determine need for appliance change or resting period  3/29/2025 2148 by Margaret Ambrose RN  Outcome: Progressing  3/29/2025 1623 by Ammon Reza RN  Outcome: Progressing  3/29/2025 1622 by Ammon Reza RN  Outcome: Progressing     Problem: ABCDS Injury Assessment  Goal: Absence of physical injury  3/29/2025 2148 by Margaret Ambrose RN  Outcome: Progressing  3/29/2025 1623 by Ammon Reza RN  Outcome: Progressing  3/29/2025 1622 by Ammon Reza RN  Outcome: Progressing

## 2025-03-31 LAB
ALBUMIN SERPL-MCNC: 2.9 G/DL (ref 3.4–5)
ANION GAP SERPL CALCULATED.3IONS-SCNC: 11 MMOL/L (ref 3–16)
BASOPHILS # BLD: 0 K/UL (ref 0–0.2)
BASOPHILS NFR BLD: 0.7 %
BUN SERPL-MCNC: 10 MG/DL (ref 7–20)
CALCIUM SERPL-MCNC: 8.4 MG/DL (ref 8.3–10.6)
CHLORIDE SERPL-SCNC: 105 MMOL/L (ref 99–110)
CO2 SERPL-SCNC: 28 MMOL/L (ref 21–32)
CREAT SERPL-MCNC: 0.5 MG/DL (ref 0.6–1.1)
DEPRECATED RDW RBC AUTO: 12.5 % (ref 12.4–15.4)
EOSINOPHIL # BLD: 0.1 K/UL (ref 0–0.6)
EOSINOPHIL NFR BLD: 1.5 %
GFR SERPLBLD CREATININE-BSD FMLA CKD-EPI: >90 ML/MIN/{1.73_M2}
GLUCOSE BLD-MCNC: 143 MG/DL (ref 70–99)
GLUCOSE BLD-MCNC: 159 MG/DL (ref 70–99)
GLUCOSE BLD-MCNC: 170 MG/DL (ref 70–99)
GLUCOSE BLD-MCNC: 180 MG/DL (ref 70–99)
GLUCOSE SERPL-MCNC: 202 MG/DL (ref 70–99)
HCT VFR BLD AUTO: 30.5 % (ref 36–48)
HGB BLD-MCNC: 10.2 G/DL (ref 12–16)
LACTATE BLDV-SCNC: 1.9 MMOL/L (ref 0.4–2)
LEGIONELLA AG UR QL: NORMAL
LYMPHOCYTES # BLD: 1.6 K/UL (ref 1–5.1)
LYMPHOCYTES NFR BLD: 36.1 %
MCH RBC QN AUTO: 30.3 PG (ref 26–34)
MCHC RBC AUTO-ENTMCNC: 33.4 G/DL (ref 31–36)
MCV RBC AUTO: 90.9 FL (ref 80–100)
MONOCYTES # BLD: 0.4 K/UL (ref 0–1.3)
MONOCYTES NFR BLD: 9.8 %
NEUTROPHILS # BLD: 2.4 K/UL (ref 1.7–7.7)
NEUTROPHILS NFR BLD: 51.9 %
PERFORMED ON: ABNORMAL
PHOSPHATE SERPL-MCNC: 2.9 MG/DL (ref 2.5–4.9)
PLATELET # BLD AUTO: 360 K/UL (ref 135–450)
PMV BLD AUTO: 8 FL (ref 5–10.5)
POTASSIUM SERPL-SCNC: 3.2 MMOL/L (ref 3.5–5.1)
PROCALCITONIN SERPL IA-MCNC: 0.08 NG/ML (ref 0–0.15)
RBC # BLD AUTO: 3.36 M/UL (ref 4–5.2)
SODIUM SERPL-SCNC: 144 MMOL/L (ref 136–145)
WBC # BLD AUTO: 4.5 K/UL (ref 4–11)

## 2025-03-31 PROCEDURE — 84145 PROCALCITONIN (PCT): CPT

## 2025-03-31 PROCEDURE — 83605 ASSAY OF LACTIC ACID: CPT

## 2025-03-31 PROCEDURE — 1200000000 HC SEMI PRIVATE

## 2025-03-31 PROCEDURE — 80069 RENAL FUNCTION PANEL: CPT

## 2025-03-31 PROCEDURE — 6360000002 HC RX W HCPCS: Performed by: STUDENT IN AN ORGANIZED HEALTH CARE EDUCATION/TRAINING PROGRAM

## 2025-03-31 PROCEDURE — 2580000003 HC RX 258: Performed by: STUDENT IN AN ORGANIZED HEALTH CARE EDUCATION/TRAINING PROGRAM

## 2025-03-31 PROCEDURE — 6360000002 HC RX W HCPCS: Performed by: INTERNAL MEDICINE

## 2025-03-31 PROCEDURE — 85025 COMPLETE CBC W/AUTO DIFF WBC: CPT

## 2025-03-31 PROCEDURE — 36415 COLL VENOUS BLD VENIPUNCTURE: CPT

## 2025-03-31 PROCEDURE — 51798 US URINE CAPACITY MEASURE: CPT

## 2025-03-31 PROCEDURE — 6370000000 HC RX 637 (ALT 250 FOR IP): Performed by: INTERNAL MEDICINE

## 2025-03-31 RX ORDER — POTASSIUM CHLORIDE 7.45 MG/ML
10 INJECTION INTRAVENOUS ONCE
Status: COMPLETED | OUTPATIENT
Start: 2025-03-31 | End: 2025-03-31

## 2025-03-31 RX ADMIN — POTASSIUM CHLORIDE 10 MEQ: 7.46 INJECTION, SOLUTION INTRAVENOUS at 12:10

## 2025-03-31 RX ADMIN — LISINOPRIL 2.5 MG: 5 TABLET ORAL at 09:15

## 2025-03-31 RX ADMIN — INSULIN LISPRO 2 UNITS: 100 INJECTION, SOLUTION INTRAVENOUS; SUBCUTANEOUS at 09:15

## 2025-03-31 RX ADMIN — OSELTAMIVIR PHOSPHATE 75 MG: 75 CAPSULE ORAL at 09:15

## 2025-03-31 RX ADMIN — ASPIRIN 81 MG: 81 TABLET, COATED ORAL at 09:15

## 2025-03-31 RX ADMIN — OSELTAMIVIR PHOSPHATE 75 MG: 75 CAPSULE ORAL at 20:35

## 2025-03-31 RX ADMIN — DEXTROSE MONOHYDRATE: 50 INJECTION, SOLUTION INTRAVENOUS at 20:35

## 2025-03-31 RX ADMIN — ENOXAPARIN SODIUM 40 MG: 100 INJECTION SUBCUTANEOUS at 09:15

## 2025-03-31 ASSESSMENT — PAIN SCALES - WONG BAKER: WONGBAKER_NUMERICALRESPONSE: NO HURT

## 2025-03-31 NOTE — PROGRESS NOTES
MD Juan Causey MD  Rufus GuzmanDO                 Office: [phone]                      Fax: [phone]             [employer]                   NEPHROLOGY CONSULT PROGRESS NOTE    Subjective / interval history / medical decision making.  -feeling better, no new complaints.    IMPRESSION/RECOMMENDATIONS:      #hypernatremia  -suspect 2/2 dehydration  -sNa 156 on presentation, correct sNa ~159  -has likely had poor po intake  -improving on D5W, continue for today.  -continue increased free water repletion. Monitor Sna goal sNa correction < 10 meq/24 hr.  -encourage PO hydration as able.    #DM  -on insulin per primary  -improved    #HTN  Controlled on home lisinopril 2.5qd    #influenza A  -positive on 3/29/25    #encephalopathy  -likely metabolic   -monitor    Thank you for the consult.  We will follow this patient along the hospitalization.  Rufus Guzman DO     High complexity, at risk of impending organ failure needing  higher level of care/monitoring.   Time spent 39 minutes that included face-to-face meeting/discussion with patient, and treatment team (including primary/referring team and other consultants; included coordination of care with the treatment team; and review of patient's electronic medical records and ordering appropriates tests.             Reason for Consult:  hypernatremia  Requesting Physician/Provider:  Dr. Amadou Hawkins MD    CHIEF COMPLAINT:   altered mental status    History obtained from records and patient.    HISTORY OF PRESENT ILLNESS:                Jose A Hoover  is 59 y.o. y.o. female with significant past medical history of dementia, DM, HTN who presented with AMS. Patient lives at SNF and staff noticed decline in her mental status and referred to ED. In ED, labs Na 156, blood glucose 380. Nephrology consulted for hypernatremia.     Past Medical History:     has a past medical history of Abnormal EKG, Depression, Diabetes mellitus (HCC), GERD  0.5 mg-albuterol 2.5 mg (DUONEB) nebulizer solution 1 Dose, 1 Dose, Inhalation, Q4H PRN  dextrose 5 % solution, , IntraVENous, Continuous  Allergies:    Patient has no known allergies.   Social History:     reports that she has never smoked. She has never used smokeless tobacco. She reports that she does not drink alcohol and does not use drugs.   Family History:   family history is not on file.     REVIEW OF SYSTEMS:    Unable to obtain ROS due to altered mental status.    PHYSICAL EXAM:    Vitals:  /86   Pulse 97   Temp 97.7 °F (36.5 °C) (Axillary)   Resp 16   Ht 1.575 m (5' 2\")   Wt 66.2 kg (145 lb 15.1 oz)   SpO2 100%   BMI 26.69 kg/m²   WEIGHT: well tolerated    Intake/Output Summary (Last 24 hours) at 3/31/2025 0941  Last data filed at 3/30/2025 1823  Gross per 24 hour   Intake 1360.43 ml   Output 700 ml   Net 660.43 ml       CONSTITUTIONAL:  awake, alert, cooperative, no apparent distress, and appears stated age  EYES:  Lids and lashes normal, pupils equal, round   NECK:  Supple, symmetrical, trachea midline  HEMATOLOGIC/LYMPHATICS:  no pallor, no cervical LAD  LUNGS:  No increased work of breathing, diminished bibasilar breaths  CARDIOVASCULAR:  regular rate and rhythm, normal S1 and S2  ABDOMEN:  No scars, normal bowel sounds, soft, non-distended, non-tender  EXTREMITIES:  Warm, dry, edema trace  NEUROLOGIC:  Awake, alert, oriented to name, place and time.      DATA:    CBC:   Lab Results   Component Value Date/Time    WBC 4.5 03/31/2025 06:13 AM    RBC 3.36 03/31/2025 06:13 AM    HGB 10.2 03/31/2025 06:13 AM    HCT 30.5 03/31/2025 06:13 AM    MCV 90.9 03/31/2025 06:13 AM    MCH 30.3 03/31/2025 06:13 AM    MCHC 33.4 03/31/2025 06:13 AM    RDW 12.5 03/31/2025 06:13 AM     03/31/2025 06:13 AM    MPV 8.0 03/31/2025 06:13 AM     BMP:   Lab Results   Component Value Date/Time     03/31/2025 06:13 AM    K 3.2 03/31/2025 06:13 AM    K 3.7 03/29/2025 06:37 AM     03/31/2025 06:13 AM

## 2025-03-31 NOTE — CARE COORDINATION
Discharge Planning:     (ELLIOTT) spoke with Dannielle/jose [facility]. Patient will need a pre-cert to return. Dannielle will start pre-cert today.    Electronically signed by Marixa Asif on 3/31/25 at 1:57 PM EDT

## 2025-03-31 NOTE — PLAN OF CARE
Problem: Chronic Conditions and Co-morbidities  Goal: Patient's chronic conditions and co-morbidity symptoms are monitored and maintained or improved  Outcome: Progressing     Problem: Pain  Goal: Verbalizes/displays adequate comfort level or baseline comfort level  3/31/2025 1423 by Oneida Garcia RN  Outcome: Progressing  3/31/2025 0235 by Earnestine Marshall RN  Outcome: Progressing     Problem: Skin/Tissue Integrity  Goal: Skin integrity remains intact  Description: 1.  Monitor for areas of redness and/or skin breakdown  2.  Assess vascular access sites hourly  3.  Every 4-6 hours minimum:  Change oxygen saturation probe site  4.  Every 4-6 hours:  If on nasal continuous positive airway pressure, respiratory therapy assess nares and determine need for appliance change or resting period  3/31/2025 1423 by Oneida Garcia RN  Outcome: Progressing  3/31/2025 0235 by Earnestine Marshall RN  Outcome: Progressing     Problem: ABCDS Injury Assessment  Goal: Absence of physical injury  Outcome: Progressing     Problem: Safety - Adult  Goal: Free from fall injury  3/31/2025 1423 by Oneida Garcia RN  Outcome: Progressing  3/31/2025 0235 by Earnestine Marshall RN  Outcome: Progressing     Problem: Nutrition Deficit:  Goal: Optimize nutritional status  Outcome: Progressing

## 2025-03-31 NOTE — PLAN OF CARE
Problem: Chronic Conditions and Co-morbidities  Goal: Patient's chronic conditions and co-morbidity symptoms are monitored and maintained or improved  3/30/2025 1349 by Lizbet Francis RN  Outcome: Progressing  Flowsheets (Taken 3/30/2025 0830)  Care Plan - Patient's Chronic Conditions and Co-Morbidity Symptoms are Monitored and Maintained or Improved: Monitor and assess patient's chronic conditions and comorbid symptoms for stability, deterioration, or improvement     Problem: Pain  Goal: Verbalizes/displays adequate comfort level or baseline comfort level  3/31/2025 0235 by Earnestine Marshall, RN  Outcome: Progressing  3/30/2025 1349 by Lizbet Francis, RN  Outcome: Progressing     Problem: Safety - Adult  Goal: Free from fall injury  3/31/2025 0235 by Earnestine Marshall RN  Outcome: Progressing  3/30/2025 1349 by Lizbet Francis, RN  Outcome: Progressing

## 2025-03-31 NOTE — PROGRESS NOTES
Shift assessment completed.  Routine vitals obtained.  Scheduled medications given.  Patient is awake, alert and oriented.  Respirations are easy and unlabored.  Resting comfortably at this time.  Call light within reach.  Bed in lowest position and locked.

## 2025-03-31 NOTE — PROGRESS NOTES
Nutrition Note    RECOMMENDATIONS  PO Diet: Consider SLP evaluation. Remove CCC modifier.   ONS: Offer magic cup BID  Nutrition Support: None      ASSESSMENT   Pt triggered positive nursing nutrition screen for wounds. Noted to have stage 3 pressure ulcer to sacrum. Pt with hx dementia and is disoriented x 4. All information obtained from chart review. Staff of [facility] has reported decline over the past week. Na+ 156 upon admission, thought to be caused by dehydration. Pt with poor PO intake during admission, consuming less than 50% of meals on pureed 4 CCC diet. Recommend to remove CCC modifier in the setting of poor PO intake. Will offer magic cup BID for additional nutrition.      Malnutrition Status: Insufficient data    NUTRITION DIAGNOSIS   Increased nutrient needs related to increase demand for energy/nutrients as evidenced by wounds  Inadequate protein-energy intake related to inadequate protein-energy intake as evidenced by intake 0-25%, intake 26-50%    Goals: PO intake 50% or greater, by next RD assessment     NUTRITION RELATED FINDINGS  Objective: Disoriented x 4. K+ 3.2. No recent weight hx available for review.  Wounds: Stage III, Pressure Injury    CURRENT NUTRITION THERAPIES  ADULT DIET; Dysphagia - Pureed; 4 carb choices (60 gm/meal)       PO Intake: 1-25%, 26-50%   PO Supplement Intake:None Ordered    ANTHROPOMETRICS  Current Height: 157.5 cm (5' 2\")  Current Weight - Scale: 66.2 kg (145 lb 15.1 oz)      COMPARATIVE STANDARDS  Total Energy Requirements (kcals/day): 1674     Protein (g):  66-79 grams       Fluid (mL/day):  1674 mL    EDUCATION  Education/Counseling not indicated     The patient will be monitored per nutrition standards of care. Consult dietitian if additional nutrition interventions are needed prior to RD reassessment.     Annabelle Turner, MS, RD, LD    Contact: [phone]

## 2025-03-31 NOTE — CARE COORDINATION
Case Management Assessment  Initial Evaluation    Date/Time of Evaluation: 3/31/2025 1:53 PM  Assessment Completed by: Marixa Asif    If patient is discharged prior to next notation, then this note serves as note for discharge by case management.    Patient Name: Jose A Hoover                   YOB: 1965  Diagnosis: Dehydration [E86.0]  Hypernatremia [E87.0]  Altered mental status, unspecified altered mental status type [R41.82]  Sepsis due to pneumonia (HCC) [J18.9, A41.9]  Multifocal pneumonia [J18.9]                   Date / Time: 3/29/2025  6:05 AM ROOM: [address] [place] [address]    Patient Admission Status: Inpatient   Readmission Risk (Low < 19, Mod (19-27), High > 27): Readmission Risk Score: 13.9    Current PCP: Mireille Wagner MD  PCP verified by CM? No (Facility Med Director)    Chart Reviewed: Yes      History Provided by: Medical Record ([facility])  Patient Orientation: Unable to Assess    Patient Cognition: Dementia / Early Alzheimer's    Hospitalization in the last 30 days (Readmission):  No    If yes, Readmission Assessment in  Navigator will be completed.    Advance Directives:      Code Status: Full Code   Patient's Primary Decision Maker is: Legal Next of Kin      Discharge Planning:    Patient expects to discharge to: Long-term care ([facility])  Plan for transportation at discharge:      Financial    Payor: [employer] [facility] MEDICAID / Plan: [facility] MEDICA / Product Type: *No Product type* /         Current Nursing Home Information:  Patient admitted from:  [address] [address]  Report: [phone]  Fax: [phone]       Call to nadia Spicer,  who confirmed the patient is: Long Term Care, Precert will be required for return.      Patient Covid vaccination status:    Internal Administration   First Dose COVID-19, PFIZER PURPLE top, DILUTE for use, (age 12 y+), 30mcg/0.3mL  10/21/2021

## 2025-03-31 NOTE — PROGRESS NOTES
Progress Note - Dr. Hawkins - Internal Medicine  PCP: Mireille Wagner  [address] / [facility] [address] [phone]    Hospital Day: 2  Code Status: Full Code  Current Diet: ADULT DIET; Dysphagia - Pureed; 4 carb choices (60 gm/meal)        CC: follow up on medical issues    Subjective:   Jose A Hoover is a 59 y.o. female.    Pt seen and examined  Chart reviewed since last visit, labs and imaging below      Na a little better  lactic still up but improving  On tamiflu, abx    Cannot obtain hx/ros from pt    Review of Systems: (1 system for EPF, 2-9 for detailed, 10+ for comprehensive)  Cannot get from pt    I have reviewed the patient's medical and social history in detail and updated the computerized patient record.  To recap: She  has a past medical history of Abnormal EKG, Depression, Diabetes mellitus (HCC), GERD (gastroesophageal reflux disease), Glossodynia, Hyperlipidemia, and Hypertension.. She  has a past surgical history that includes Finger surgery and Cholecystectomy.. She  reports that she has never smoked. She has never used smokeless tobacco. She reports that she does not drink alcohol and does not use drugs..        Active Hospital Problems    Diagnosis Date Noted    Influenza [J11.1] 03/30/2025    Pneumonia [J18.9] 03/29/2025    Hypernatremia [E87.0] 03/29/2025    Sepsis (HCC) [A41.9] 03/29/2025    Sepsis due to pneumonia (HCC) [J18.9, A41.9] 03/29/2025    Diabetes mellitus (HCC) [E11.9] 08/05/2019    HTN (hypertension), benign [I10] 08/10/2017       Current Facility-Administered Medications: oseltamivir (TAMIFLU) capsule 75 mg, 75 mg, Oral, BID  fluticasone (FLONASE) 50 MCG/ACT nasal spray 1 spray, 1 spray, Each Nostril, Daily PRN  aspirin EC tablet 81 mg, 81 mg, Oral, Daily  lisinopril (PRINIVIL;ZESTRIL) tablet 2.5 mg, 2.5 mg, Oral, Daily  pantoprazole (PROTONIX) tablet 40 mg, 40 mg, Oral, QAM AC  sodium chloride flush 0.9 % injection 5-40 mL, 5-40 mL, IntraVENous, 2 times per

## 2025-04-01 LAB
ALBUMIN SERPL-MCNC: 2.8 G/DL (ref 3.4–5)
ANION GAP SERPL CALCULATED.3IONS-SCNC: 12 MMOL/L (ref 3–16)
BASE EXCESS BLDA CALC-SCNC: 6.5 MMOL/L (ref -3–3)
BASOPHILS # BLD: 0 K/UL (ref 0–0.2)
BASOPHILS NFR BLD: 0.9 %
BUN SERPL-MCNC: 7 MG/DL (ref 7–20)
CALCIUM SERPL-MCNC: 8.2 MG/DL (ref 8.3–10.6)
CHLORIDE SERPL-SCNC: 99 MMOL/L (ref 99–110)
CO2 BLDA-SCNC: 69.4 MMOL/L
CO2 SERPL-SCNC: 24 MMOL/L (ref 21–32)
COHGB MFR BLDA: 0.9 % (ref 0–1.5)
CREAT SERPL-MCNC: 0.6 MG/DL (ref 0.6–1.1)
DEPRECATED RDW RBC AUTO: 12.4 % (ref 12.4–15.4)
EOSINOPHIL # BLD: 0.1 K/UL (ref 0–0.6)
EOSINOPHIL NFR BLD: 1.1 %
GFR SERPLBLD CREATININE-BSD FMLA CKD-EPI: >90 ML/MIN/{1.73_M2}
GLUCOSE BLD-MCNC: 138 MG/DL (ref 70–99)
GLUCOSE BLD-MCNC: 149 MG/DL (ref 70–99)
GLUCOSE BLD-MCNC: 231 MG/DL (ref 70–99)
GLUCOSE BLD-MCNC: 236 MG/DL (ref 70–99)
GLUCOSE SERPL-MCNC: 260 MG/DL (ref 70–99)
HCO3 BLDA-SCNC: 29.8 MMOL/L (ref 21–29)
HCT VFR BLD AUTO: 31.6 % (ref 36–48)
HGB BLD-MCNC: 10.4 G/DL (ref 12–16)
HGB BLDA-MCNC: 10 G/DL (ref 12–16)
LYMPHOCYTES # BLD: 1.4 K/UL (ref 1–5.1)
LYMPHOCYTES NFR BLD: 28.8 %
MAGNESIUM SERPL-MCNC: 1.49 MG/DL (ref 1.8–2.4)
MCH RBC QN AUTO: 29.9 PG (ref 26–34)
MCHC RBC AUTO-ENTMCNC: 32.9 G/DL (ref 31–36)
MCV RBC AUTO: 90.9 FL (ref 80–100)
METHGB MFR BLDA: 0.8 %
MONOCYTES # BLD: 0.6 K/UL (ref 0–1.3)
MONOCYTES NFR BLD: 11.9 %
NEUTROPHILS # BLD: 2.9 K/UL (ref 1.7–7.7)
NEUTROPHILS NFR BLD: 57.3 %
O2 THERAPY: ABNORMAL
PCO2 BLDA: 36.8 MMHG (ref 35–45)
PERFORMED ON: ABNORMAL
PH BLDA: 7.52 [PH] (ref 7.35–7.45)
PHOSPHATE SERPL-MCNC: 2.6 MG/DL (ref 2.5–4.9)
PLATELET # BLD AUTO: 371 K/UL (ref 135–450)
PMV BLD AUTO: 8.3 FL (ref 5–10.5)
PO2 BLDA: 103 MMHG (ref 75–108)
POTASSIUM SERPL-SCNC: 3 MMOL/L (ref 3.5–5.1)
POTASSIUM SERPL-SCNC: 3.5 MMOL/L (ref 3.5–5.1)
PROCALCITONIN SERPL IA-MCNC: 0.14 NG/ML (ref 0–0.15)
RBC # BLD AUTO: 3.48 M/UL (ref 4–5.2)
SAO2 % BLDA: 99.2 %
SODIUM SERPL-SCNC: 135 MMOL/L (ref 136–145)
WBC # BLD AUTO: 5 K/UL (ref 4–11)

## 2025-04-01 PROCEDURE — 84132 ASSAY OF SERUM POTASSIUM: CPT

## 2025-04-01 PROCEDURE — 94761 N-INVAS EAR/PLS OXIMETRY MLT: CPT

## 2025-04-01 PROCEDURE — 85025 COMPLETE CBC W/AUTO DIFF WBC: CPT

## 2025-04-01 PROCEDURE — 6370000000 HC RX 637 (ALT 250 FOR IP): Performed by: INTERNAL MEDICINE

## 2025-04-01 PROCEDURE — 36600 WITHDRAWAL OF ARTERIAL BLOOD: CPT

## 2025-04-01 PROCEDURE — 80069 RENAL FUNCTION PANEL: CPT

## 2025-04-01 PROCEDURE — 36415 COLL VENOUS BLD VENIPUNCTURE: CPT

## 2025-04-01 PROCEDURE — 6360000002 HC RX W HCPCS: Performed by: INTERNAL MEDICINE

## 2025-04-01 PROCEDURE — 2500000003 HC RX 250 WO HCPCS: Performed by: INTERNAL MEDICINE

## 2025-04-01 PROCEDURE — 51798 US URINE CAPACITY MEASURE: CPT

## 2025-04-01 PROCEDURE — 2700000000 HC OXYGEN THERAPY PER DAY

## 2025-04-01 PROCEDURE — 1200000000 HC SEMI PRIVATE

## 2025-04-01 PROCEDURE — 84145 PROCALCITONIN (PCT): CPT

## 2025-04-01 PROCEDURE — 6360000002 HC RX W HCPCS: Performed by: NURSE PRACTITIONER

## 2025-04-01 PROCEDURE — 82803 BLOOD GASES ANY COMBINATION: CPT

## 2025-04-01 PROCEDURE — 83735 ASSAY OF MAGNESIUM: CPT

## 2025-04-01 RX ORDER — POTASSIUM CHLORIDE 1500 MG/1
40 TABLET, EXTENDED RELEASE ORAL PRN
Status: DISCONTINUED | OUTPATIENT
Start: 2025-04-01 | End: 2025-04-01 | Stop reason: SDUPTHER

## 2025-04-01 RX ORDER — POTASSIUM CHLORIDE 7.45 MG/ML
10 INJECTION INTRAVENOUS PRN
Status: DISCONTINUED | OUTPATIENT
Start: 2025-04-01 | End: 2025-04-01 | Stop reason: SDUPTHER

## 2025-04-01 RX ORDER — PANTOPRAZOLE SODIUM 40 MG/10ML
40 INJECTION, POWDER, LYOPHILIZED, FOR SOLUTION INTRAVENOUS DAILY
Status: DISCONTINUED | OUTPATIENT
Start: 2025-04-01 | End: 2025-04-02 | Stop reason: HOSPADM

## 2025-04-01 RX ORDER — POTASSIUM CHLORIDE 7.45 MG/ML
10 INJECTION INTRAVENOUS
Status: COMPLETED | OUTPATIENT
Start: 2025-04-01 | End: 2025-04-01

## 2025-04-01 RX ORDER — MAGNESIUM SULFATE IN WATER 40 MG/ML
2000 INJECTION, SOLUTION INTRAVENOUS PRN
Status: DISCONTINUED | OUTPATIENT
Start: 2025-04-01 | End: 2025-04-02 | Stop reason: HOSPADM

## 2025-04-01 RX ADMIN — POTASSIUM CHLORIDE 10 MEQ: 7.46 INJECTION, SOLUTION INTRAVENOUS at 11:42

## 2025-04-01 RX ADMIN — PANTOPRAZOLE SODIUM 40 MG: 40 INJECTION, POWDER, FOR SOLUTION INTRAVENOUS at 05:46

## 2025-04-01 RX ADMIN — OSELTAMIVIR PHOSPHATE 75 MG: 75 CAPSULE ORAL at 09:39

## 2025-04-01 RX ADMIN — INSULIN LISPRO 2 UNITS: 100 INJECTION, SOLUTION INTRAVENOUS; SUBCUTANEOUS at 09:34

## 2025-04-01 RX ADMIN — MAGNESIUM SULFATE HEPTAHYDRATE 2000 MG: 40 INJECTION, SOLUTION INTRAVENOUS at 22:35

## 2025-04-01 RX ADMIN — POTASSIUM CHLORIDE 10 MEQ: 7.46 INJECTION, SOLUTION INTRAVENOUS at 22:36

## 2025-04-01 RX ADMIN — OSELTAMIVIR PHOSPHATE 75 MG: 75 CAPSULE ORAL at 20:47

## 2025-04-01 RX ADMIN — INSULIN LISPRO 2 UNITS: 100 INJECTION, SOLUTION INTRAVENOUS; SUBCUTANEOUS at 13:42

## 2025-04-01 RX ADMIN — POTASSIUM CHLORIDE 10 MEQ: 7.46 INJECTION, SOLUTION INTRAVENOUS at 13:38

## 2025-04-01 RX ADMIN — ENOXAPARIN SODIUM 40 MG: 100 INJECTION SUBCUTANEOUS at 09:35

## 2025-04-01 RX ADMIN — SODIUM CHLORIDE, PRESERVATIVE FREE 10 ML: 5 INJECTION INTRAVENOUS at 20:47

## 2025-04-01 RX ADMIN — ASPIRIN 81 MG: 81 TABLET, COATED ORAL at 09:39

## 2025-04-01 RX ADMIN — LISINOPRIL 2.5 MG: 5 TABLET ORAL at 09:40

## 2025-04-01 ASSESSMENT — PAIN SCALES - WONG BAKER: WONGBAKER_NUMERICALRESPONSE: NO HURT

## 2025-04-01 NOTE — PLAN OF CARE
Problem: Chronic Conditions and Co-morbidities  Goal: Patient's chronic conditions and co-morbidity symptoms are monitored and maintained or improved  4/1/2025 0354 by Jessi Acharya RN  Outcome: Progressing  Flowsheets (Taken 3/31/2025 1930)  Care Plan - Patient's Chronic Conditions and Co-Morbidity Symptoms are Monitored and Maintained or Improved:   Monitor and assess patient's chronic conditions and comorbid symptoms for stability, deterioration, or improvement   Collaborate with multidisciplinary team to address chronic and comorbid conditions and prevent exacerbation or deterioration   Update acute care plan with appropriate goals if chronic or comorbid symptoms are exacerbated and prevent overall improvement and discharge  3/31/2025 1423 by Oneida Garcia RN  Outcome: Progressing     Problem: Pain  Goal: Verbalizes/displays adequate comfort level or baseline comfort level  4/1/2025 0354 by Jessi Acharya RN  Outcome: Progressing  3/31/2025 1423 by Oneida Garcia RN  Outcome: Progressing     Problem: Skin/Tissue Integrity  Goal: Skin integrity remains intact  Description: 1.  Monitor for areas of redness and/or skin breakdown  2.  Assess vascular access sites hourly  3.  Every 4-6 hours minimum:  Change oxygen saturation probe site  4.  Every 4-6 hours:  If on nasal continuous positive airway pressure, respiratory therapy assess nares and determine need for appliance change or resting period  4/1/2025 0354 by Jessi Acharya RN  Outcome: Progressing  3/31/2025 1423 by Oneida Garcia RN  Outcome: Progressing     Problem: ABCDS Injury Assessment  Goal: Absence of physical injury  4/1/2025 0354 by Jessi Acharya RN  Outcome: Progressing  3/31/2025 1423 by Oneida Garcia RN  Outcome: Progressing     Problem: Safety - Adult  Goal: Free from fall injury  4/1/2025 0354 by Jessi Acharya RN  Outcome: Progressing  3/31/2025 1423 by Oneida Garcia RN  Outcome: Progressing     Problem: Nutrition  Deficit:  Goal: Optimize nutritional status  4/1/2025 0354 by Jessi Acharya, RN  Outcome: Progressing  3/31/2025 1423 by Onieda Garcia, RN  Outcome: Progressing

## 2025-04-01 NOTE — FLOWSHEET NOTE
Patient seen for wound present on admission.  Patient is from [facility].  Wound is pink, the edges have frayed skin also areas of hyperpigmentation. The wound is exposed dermis that is pink.  Recommend barrier cream, continue to use pure wick and provide incontinence care as needed.  CASANDRA PRITCHARDN, RN, CWOCN  Inpatient  Wound/Ostomy Care  [phone]          04/01/25 1808   Wound 03/29/25 Sacrum Mid   Date First Assessed/Time First Assessed: 03/29/25 1445   Primary Wound Type: Pressure Injury  Location: Sacrum  Wound Location Orientation: Mid   Wound Image    Wound Etiology Pressure Stage 2   Dressing Status Other (Comment)   Wound Cleansed Other (Comment)  (barrier wipes, bath wipes)   Dressing Change Due 04/01/25   Wound Length (cm) 5.5 cm   Wound Width (cm) 4 cm   Wound Depth (cm) 0.1 cm   Wound Surface Area (cm^2) 22 cm^2   Wound Volume (cm^3) 2.2 cm^3   Wound Assessment Pink/red   Drainage Amount None (dry)   Mirna-wound Assessment Intact   Margins Defined edges   Wound Thickness Description not for Pressure Injury Partial thickness

## 2025-04-01 NOTE — PROGRESS NOTES
Progress Note - Dr. Hawkins - Internal Medicine  PCP: Mireille Wagner  [facility] [address] / [facility] [address] [phone]    Hospital Day: 3  Code Status: Full Code  Current Diet: ADULT DIET; Dysphagia - Pureed  ADULT ORAL NUTRITION SUPPLEMENT; Lunch, Dinner; Frozen Oral Supplement        CC: follow up on medical issues    Subjective:   Jose A Hoover is a 59 y.o. female.    Pt seen and examined  Chart reviewed since last visit, labs and imaging below      Na now low normal 135  lactic improving  On tamiflu,     Cannot obtain hx/ros from pt    Awaiting pre-cert    Review of Systems: (1 system for EPF, 2-9 for detailed, 10+ for comprehensive)  Cannot get from pt    I have reviewed the patient's medical and social history in detail and updated the computerized patient record.  To recap: She  has a past medical history of Abnormal EKG, Depression, Diabetes mellitus (HCC), GERD (gastroesophageal reflux disease), Glossodynia, Hyperlipidemia, and Hypertension.. She  has a past surgical history that includes Finger surgery and Cholecystectomy.. She  reports that she has never smoked. She has never used smokeless tobacco. She reports that she does not drink alcohol and does not use drugs..        Active Hospital Problems    Diagnosis Date Noted    Influenza [J11.1] 03/30/2025    Pneumonia [J18.9] 03/29/2025    Hypernatremia [E87.0] 03/29/2025    Sepsis (HCC) [A41.9] 03/29/2025    Sepsis due to pneumonia (HCC) [J18.9, A41.9] 03/29/2025    Diabetes mellitus (HCC) [E11.9] 08/05/2019    HTN (hypertension), benign [I10] 08/10/2017       Current Facility-Administered Medications: pantoprazole (PROTONIX) injection 40 mg, 40 mg, IntraVENous, Daily  oseltamivir (TAMIFLU) capsule 75 mg, 75 mg, Oral, BID  fluticasone (FLONASE) 50 MCG/ACT nasal spray 1 spray, 1 spray, Each Nostril, Daily PRN  aspirin EC tablet 81 mg, 81 mg, Oral, Daily  lisinopril (PRINIVIL;ZESTRIL) tablet 2.5 mg, 2.5 mg, Oral, Daily  sodium chloride flush 0.9

## 2025-04-01 NOTE — CONSULTS
PALLIATIVE MEDICINE CONSULTATION     Patient name:Jose A Hoover   MRN:[id number]    :1965  Room/Bed:N-5563/5563-01   LOS: 3 days         Date of consult:2025    Palliative Care Eval  Consult performed by: Radha Judd APRN - CNP  Consult ordered by: Amadou Hawkins MD  Reason for consult: GOC and code status              ASSESSMENT/RECOMMENDATIONS     59 y.o. female with AMS and Flu A with severe dementia at baseline       Symptom Management:  AMS- pt not verbally responding today, not making eye contact unsure of baseline   Flu A- wet cough on 2L oxygen   Goals of Care- Pt non-verbal, left message with facility to check on code status also left message for sister Inocencio to discuss GOC. Awaiting return call.     Patient/Family Goals of Care :    Pt non-verbal, left message with facility to check on code status also left message for sister Inocencio to discuss GOC. Awaiting return call.     Disposition/Discharge Plan:   Pending     Advance Directives:    The patient has appointed the following active healthcare agents:    Primary Decision Maker: Inocencio Inman - Brother/Sister - [phone]    The Patient has the following current code status:    Code Status: Full Code      Interactive exchange regarding medications,tests and procedures with: patient, floor RN, Dr Hawkins   Thank you for allowing us to participate in the care of this patient.      HISTORY     CC: AMS  HPI: The patient is a 59 y.o. female with significant past medical history of dementia, DM, HTN who presented with AMS. Patient lives at SNF and staff noticed decline in her mental status and referred to ED.      Palliative Medicine SymptomScreening/ROS:    Review of Systems   Unable to perform ROS: Mental status change             Palliative Performance Scale:     [] 60%  Amb reduced; Sig dz. Can't do hobbies/housework; Intake normal or reduced, Occasional assist; LOC full/confusion   [] 50%  Mainly sit/lie; Extensive

## 2025-04-01 NOTE — PLAN OF CARE
Problem: Pain  Goal: Verbalizes/displays adequate comfort level or baseline comfort level  4/1/2025 1511 by Oneida Garcia RN  Outcome: Progressing  4/1/2025 0354 by Jessi Acharya RN  Outcome: Progressing     Problem: Skin/Tissue Integrity  Goal: Skin integrity remains intact  Description: 1.  Monitor for areas of redness and/or skin breakdown  2.  Assess vascular access sites hourly  3.  Every 4-6 hours minimum:  Change oxygen saturation probe site  4.  Every 4-6 hours:  If on nasal continuous positive airway pressure, respiratory therapy assess nares and determine need for appliance change or resting period  4/1/2025 1511 by Oneida Garcia RN  Outcome: Progressing  4/1/2025 0354 by Jessi Acharya RN  Outcome: Progressing     Problem: ABCDS Injury Assessment  Goal: Absence of physical injury  4/1/2025 1511 by Oneida Garcia RN  Outcome: Progressing  4/1/2025 0354 by Jessi Acharya RN  Outcome: Progressing     Problem: Safety - Adult  Goal: Free from fall injury  4/1/2025 1511 by Oneida Garcia RN  Outcome: Progressing  4/1/2025 0354 by Jessi Acharya RN  Outcome: Progressing     Problem: Chronic Conditions and Co-morbidities  Goal: Patient's chronic conditions and co-morbidity symptoms are monitored and maintained or improved  4/1/2025 1511 by Oneida Garcia RN  Outcome: Not Progressing  4/1/2025 0354 by Jessi Acharya RN  Outcome: Progressing  Flowsheets (Taken 3/31/2025 1930)  Care Plan - Patient's Chronic Conditions and Co-Morbidity Symptoms are Monitored and Maintained or Improved:   Monitor and assess patient's chronic conditions and comorbid symptoms for stability, deterioration, or improvement   Collaborate with multidisciplinary team to address chronic and comorbid conditions and prevent exacerbation or deterioration   Update acute care plan with appropriate goals if chronic or comorbid symptoms are exacerbated and prevent overall improvement and discharge     Problem: Nutrition  Deficit:  Goal: Optimize nutritional status  4/1/2025 1511 by Oneida Garcia, RN  Outcome: Not Progressing  4/1/2025 0354 by Jessi Acharya, RN  Outcome: Progressing     Pt has poor po intake.  Purses lips together tightly with food, drink and crushed meds in pudding.  Dr. Hawkins aware of situation.

## 2025-04-02 VITALS
BODY MASS INDEX: 26.86 KG/M2 | DIASTOLIC BLOOD PRESSURE: 81 MMHG | OXYGEN SATURATION: 96 % | WEIGHT: 145.94 LBS | TEMPERATURE: 98.3 F | RESPIRATION RATE: 16 BRPM | HEART RATE: 106 BPM | HEIGHT: 62 IN | SYSTOLIC BLOOD PRESSURE: 133 MMHG

## 2025-04-02 PROBLEM — L89.159 SACRAL PRESSURE ULCER: Status: ACTIVE | Noted: 2025-04-02

## 2025-04-02 LAB
ALBUMIN SERPL-MCNC: 2.8 G/DL (ref 3.4–5)
ANION GAP SERPL CALCULATED.3IONS-SCNC: 11 MMOL/L (ref 3–16)
BACTERIA BLD CULT ORG #2: NORMAL
BACTERIA BLD CULT: NORMAL
BASOPHILS # BLD: 0 K/UL (ref 0–0.2)
BASOPHILS NFR BLD: 0.9 %
BUN SERPL-MCNC: 6 MG/DL (ref 7–20)
CALCIUM SERPL-MCNC: 8.6 MG/DL (ref 8.3–10.6)
CHLORIDE SERPL-SCNC: 104 MMOL/L (ref 99–110)
CO2 SERPL-SCNC: 25 MMOL/L (ref 21–32)
CREAT SERPL-MCNC: 0.5 MG/DL (ref 0.6–1.1)
DEPRECATED RDW RBC AUTO: 12.5 % (ref 12.4–15.4)
EOSINOPHIL # BLD: 0.1 K/UL (ref 0–0.6)
EOSINOPHIL NFR BLD: 1 %
GFR SERPLBLD CREATININE-BSD FMLA CKD-EPI: >90 ML/MIN/{1.73_M2}
GLUCOSE BLD-MCNC: 198 MG/DL (ref 70–99)
GLUCOSE BLD-MCNC: 289 MG/DL (ref 70–99)
GLUCOSE BLD-MCNC: 304 MG/DL (ref 70–99)
GLUCOSE BLD-MCNC: 31 MG/DL (ref 70–99)
GLUCOSE SERPL-MCNC: 205 MG/DL (ref 70–99)
HCT VFR BLD AUTO: 28.1 % (ref 36–48)
HGB BLD-MCNC: 9.5 G/DL (ref 12–16)
LYMPHOCYTES # BLD: 1.5 K/UL (ref 1–5.1)
LYMPHOCYTES NFR BLD: 28.1 %
MAGNESIUM SERPL-MCNC: 1.9 MG/DL (ref 1.8–2.4)
MCH RBC QN AUTO: 30.7 PG (ref 26–34)
MCHC RBC AUTO-ENTMCNC: 33.9 G/DL (ref 31–36)
MCV RBC AUTO: 90.4 FL (ref 80–100)
MONOCYTES # BLD: 0.7 K/UL (ref 0–1.3)
MONOCYTES NFR BLD: 14.2 %
NEUTROPHILS # BLD: 2.9 K/UL (ref 1.7–7.7)
NEUTROPHILS NFR BLD: 55.8 %
PERFORMED ON: ABNORMAL
PHOSPHATE SERPL-MCNC: 2.1 MG/DL (ref 2.5–4.9)
PLATELET # BLD AUTO: 399 K/UL (ref 135–450)
PMV BLD AUTO: 7.7 FL (ref 5–10.5)
POTASSIUM SERPL-SCNC: 3.4 MMOL/L (ref 3.5–5.1)
PROCALCITONIN SERPL IA-MCNC: 0.21 NG/ML (ref 0–0.15)
RBC # BLD AUTO: 3.1 M/UL (ref 4–5.2)
SODIUM SERPL-SCNC: 140 MMOL/L (ref 136–145)
WBC # BLD AUTO: 5.2 K/UL (ref 4–11)

## 2025-04-02 PROCEDURE — 84145 PROCALCITONIN (PCT): CPT

## 2025-04-02 PROCEDURE — 85025 COMPLETE CBC W/AUTO DIFF WBC: CPT

## 2025-04-02 PROCEDURE — 6370000000 HC RX 637 (ALT 250 FOR IP): Performed by: INTERNAL MEDICINE

## 2025-04-02 PROCEDURE — 83735 ASSAY OF MAGNESIUM: CPT

## 2025-04-02 PROCEDURE — 2500000003 HC RX 250 WO HCPCS: Performed by: INTERNAL MEDICINE

## 2025-04-02 PROCEDURE — 2580000003 HC RX 258: Performed by: INTERNAL MEDICINE

## 2025-04-02 PROCEDURE — 6360000002 HC RX W HCPCS: Performed by: INTERNAL MEDICINE

## 2025-04-02 PROCEDURE — 80069 RENAL FUNCTION PANEL: CPT

## 2025-04-02 PROCEDURE — 36415 COLL VENOUS BLD VENIPUNCTURE: CPT

## 2025-04-02 RX ORDER — OSELTAMIVIR PHOSPHATE 75 MG/1
75 CAPSULE ORAL 2 TIMES DAILY
Qty: 4 CAPSULE | Refills: 0 | Status: SHIPPED | OUTPATIENT
Start: 2025-04-02 | End: 2025-04-04

## 2025-04-02 RX ADMIN — PANTOPRAZOLE SODIUM 40 MG: 40 INJECTION, POWDER, FOR SOLUTION INTRAVENOUS at 09:56

## 2025-04-02 RX ADMIN — SODIUM CHLORIDE, PRESERVATIVE FREE 10 ML: 5 INJECTION INTRAVENOUS at 09:53

## 2025-04-02 RX ADMIN — DEXTROSE 250 ML: 10 SOLUTION INTRAVENOUS at 12:26

## 2025-04-02 RX ADMIN — ASPIRIN 81 MG: 81 TABLET, COATED ORAL at 09:55

## 2025-04-02 RX ADMIN — LISINOPRIL 2.5 MG: 5 TABLET ORAL at 09:55

## 2025-04-02 RX ADMIN — POTASSIUM PHOSPHATE, MONOBASIC POTASSIUM PHOSPHATE, DIBASIC 21 MMOL: 224; 236 INJECTION, SOLUTION, CONCENTRATE INTRAVENOUS at 09:52

## 2025-04-02 RX ADMIN — ENOXAPARIN SODIUM 40 MG: 100 INJECTION SUBCUTANEOUS at 09:58

## 2025-04-02 RX ADMIN — OSELTAMIVIR PHOSPHATE 75 MG: 75 CAPSULE ORAL at 09:56

## 2025-04-02 RX ADMIN — POTASSIUM CHLORIDE 10 MEQ: 7.46 INJECTION, SOLUTION INTRAVENOUS at 00:12

## 2025-04-02 ASSESSMENT — PAIN SCALES - WONG BAKER: WONGBAKER_NUMERICALRESPONSE: NO HURT

## 2025-04-02 NOTE — PROGRESS NOTES
Messaged Dr. Hawkins about the patient's low blood sugar. PRN medications given to correct the blood sugar.

## 2025-04-02 NOTE — CARE COORDINATION
Discharge Plan:  Patient discharge to:    [facility]  [address] [place] [address]  Report: [phone]  Fax: [phone]       EVERARDO faxed REAGAN and AVS to [phone]    AMIE Somers to call report to [phone]    Medical transport with [facility], 430pm  time     Patient will discharge back to [facility] . Facility made aware. CM unable to reach emergency [facility] [phone] non-working number. CM put a note with the fax to notify the facility that Cm was unable to reach family. No further needs at this time.    Electronically signed by Mraixa Asif on 4/2/25 at 2:35 PM EDT

## 2025-04-02 NOTE — PLAN OF CARE
Problem: Chronic Conditions and Co-morbidities  Goal: Patient's chronic conditions and co-morbidity symptoms are monitored and maintained or improved  Outcome: Completed     Problem: Pain  Goal: Verbalizes/displays adequate comfort level or baseline comfort level  Outcome: Completed     Problem: Skin/Tissue Integrity  Goal: Skin integrity remains intact  Description: 1.  Monitor for areas of redness and/or skin breakdown  2.  Assess vascular access sites hourly  3.  Every 4-6 hours minimum:  Change oxygen saturation probe site  4.  Every 4-6 hours:  If on nasal continuous positive airway pressure, respiratory therapy assess nares and determine need for appliance change or resting period  Outcome: Completed     Problem: ABCDS Injury Assessment  Goal: Absence of physical injury  Outcome: Completed     Problem: Safety - Adult  Goal: Free from fall injury  Outcome: Completed     Problem: Nutrition Deficit:  Goal: Optimize nutritional status  Outcome: Completed

## 2025-04-02 NOTE — DISCHARGE SUMMARY
[facility] -- Physician Discharge Summary     Jose A Hoover  1965  MRN: [id number]    Admit Date: 3/29/2025  Discharge Date: No discharge date for patient encounter.    Attending MD: Amadou Hawkins MD  Discharging MD: Amadou Hawkins MD  PCP: Mireille Wagner  [facility] [address] [facility] [address] [phone]    Admission Diagnosis: Dehydration [E86.0]  Hypernatremia [E87.0]  Altered mental status, unspecified altered mental status type [R41.82]  Sepsis due to pneumonia (HCC) [J18.9, A41.9]  Multifocal pneumonia [J18.9]  DISCHARGE DIAGNOSIS: same    Full Hospital Problem List:  Active Hospital Problems    Diagnosis Date Noted    Sacral pressure ulcer [L89.159] 04/02/2025    Influenza [J11.1] 03/30/2025    Pneumonia [J18.9] 03/29/2025    Hypernatremia [E87.0] 03/29/2025    Sepsis (HCC) [A41.9] 03/29/2025    Sepsis due to pneumonia (HCC) [J18.9, A41.9] 03/29/2025    Diabetes mellitus (HCC) [E11.9] 08/05/2019    HTN (hypertension), benign [I10] 08/10/2017           Hospital Course:  59 y.o. female who presents because of concern for altered mental status.  She has a history of dementia and lives at [facility].  She has been noted by staff to have a decline over the past week.  She is altered from her baseline.  She is not able to provide any history and does not communicate.     Labs show pneumonia, likely sepsis  Na is extremely elevated as well  She if flu positive and this is felt to be cause  Pt started on tamiflu while here  She does seem to improve on this    Seen by wound care - she has stage 3 pressure ulcer to sacrum, PRESENT ON ADMIT  Local care is recommended    Pt to return to SNF to finish 5d course of tamiflu    Consults made during Hospitalization:  IP CONSULT TO NEPHROLOGY  PALLIATIVE CARE EVAL    Treatment team at time of Discharge: Treatment Team:   Amadou Hawkins MD Yu, Dale Patrick, MD Chiu, MD Jose Ahn Jennifer, RN Kharel, Brandon,  Lizbet Rachel RN    Imaging Results:  XR CHEST PORTABLE  Result Date: 3/30/2025  EXAMINATION: ONE XRAY VIEW OF THE CHEST 3/30/2025 8:56 am COMPARISON: None. HISTORY: ORDERING SYSTEM PROVIDED HISTORY: pneumonia TECHNOLOGIST PROVIDED HISTORY: Reason for exam:->pneumonia FINDINGS: Lungs: Low lung volumes with bronchovascular crowding.  Subtle retrocardiac airspace opacities. Pleura: No effusion or pneumothorax. Cardiomediastinal silhouette: Tortuosity of the thoracic aorta. Normal heart size. Bones: No acute bony findings.  Degenerative changes of the bilateral shoulders. Soft tissues: Normal.     Low lung volumes with bronchovascular crowding. Subtle retrocardiac airspace opacities which may represent underlying pneumonia versus atelectasis.  Correlate with clinical findings.     CT CHEST ABDOMEN PELVIS WO CONTRAST Additional Contrast? None  Result Date: 3/29/2025  EXAMINATION: CT OF THE CHEST, ABDOMEN, AND PELVIS WITHOUT CONTRAST 3/29/2025 6:48 am TECHNIQUE: CT of the chest, abdomen and pelvis was performed without the administration of intravenous contrast. Multiplanar reformatted images are provided for review. Automated exposure control, iterative reconstruction, and/or weight based adjustment of the mA/kV was utilized to reduce the radiation dose to as low as reasonably achievable. COMPARISON: 06/06/2021 HISTORY: ORDERING SYSTEM PROVIDED HISTORY: confusion, tachycardia TECHNOLOGIST PROVIDED HISTORY: Reason for exam:->confusion, tachycardia Additional Contrast?->None Decision Support Exception - unselect if not a suspected or confirmed emergency medical condition->Emergency Medical Condition (MA) FINDINGS: Chest: Mediastinum: The central airways are patent.  There is no hilar or mediastinal adenopathy. Lungs/pleura: Patchy consolidation is present within the lung bases and lingula.  There is no pneumothorax or effusion. Soft Tissues/Bones: There is no acute fracture or aggressive osseous lesion.

## 2025-04-02 NOTE — DISCHARGE INSTR - COC
Continuity of Care Form    Patient Name: Jose A Hoover   :  1965  MRN:  [id number]    Admit date:  3/29/2025  Discharge date:  ***    Code Status Order: Full Code   Advance Directives:     Admitting Physician:  Amadou Hawkins MD  PCP: Mireille Wagner MD    Discharging Nurse: ***  Discharging Hospital Unit/Room#: 5TN-5563/5563-01  Discharging Unit Phone Number: ***    Emergency Contact:   Extended Emergency Contact Information  Primary Emergency Contact: Inocencio Inman  Address: [address]  Home Phone: [phone]  Mobile Phone: [phone]  Relation: Brother/Sister    Past Surgical History:  Past Surgical History:   Procedure Laterality Date    CHOLECYSTECTOMY      FINGER SURGERY         Immunization History:   Immunization History   Administered Date(s) Administered    COVID-19, PFIZER GRAY top, DO NOT Dilute, (age 12 y+), IM, 30 mcg/0.3 mL 2022    COVID-19, PFIZER PURPLE top, DILUTE for use, (age 12 y+), 30mcg/0.3mL 10/21/2021       Active Problems:  Patient Active Problem List   Diagnosis Code    Abnormal EKG R94.31    Calculus of gallbladder with acute cholecystitis K80.00    Diabetes mellitus (HCC) E11.9    Dizziness and giddiness R42    HTN (hypertension), benign I10    Mixed hyperlipidemia E78.2    Shortness of breath R06.02    Disequilibrium R42    Burning sensation of throat R07.0    Burning tongue K14.6    Acute confusional state F05    Dementia (HCC) F03.90    Altered mental status R41.82    Uncontrolled type 2 diabetes mellitus with hyperglycemia (HCC) E11.65    Dyslipidemia E78.5    Pneumonia J18.9    Hypernatremia E87.0    Sepsis (HCC) A41.9    Sepsis due to pneumonia (HCC) J18.9, A41.9    Influenza J11.1    Sacral pressure ulcer L89.159       Isolation/Infection:   Isolation            Droplet          Patient Infection Status        Infection Onset Added Last Indicated Last Indicated By Review Planned Expiration     Concerns:     At Risk for Falls    Impairments/Disabilities:      Speech    Nutrition Therapy:  Current Nutrition Therapy:   - Oral Diet:  Dysphagia - Pureed    Routes of Feeding: Oral  Liquids: Thin Liquids  Daily Fluid Restriction: no  Last Modified Barium Swallow with Video (Video Swallowing Test): not done    Treatments at the Time of Hospital Discharge:   Respiratory Treatments: ***  Oxygen Therapy:  is not on home oxygen therapy.  Ventilator:    - No ventilator support    Rehab Therapies: Physical Therapy, Occupational Therapy, and Speech/Language Therapy  Weight Bearing Status/Restrictions: No weight bearing restrictions  Other Medical Equipment (for information only, NOT a DME order):  hospital bed  Other Treatments: ***    Patient's personal belongings (please select all that are sent with patient):  None    RN SIGNATURE:  Electronically signed by Lizbet Jimenes RN on 4/2/25 at 3:10 PM EDT    CASE MANAGEMENT/SOCIAL WORK SECTION    Inpatient Status Date: ***    Readmission Risk Assessment Score:  [facility] RISK OF UNPLANNED READMISSION 2.0             13.9 Total Score        Discharging to Facility/ Agency   [address] [address]  Report: [phone]  Fax: [phone]       / signature: Electronically signed by Marixa Asif on 4/2/25 at 2:14 PM EDT    PHYSICIAN SECTION    Prognosis: Guarded    Condition at Discharge: Stable    Rehab Potential (if transferring to Rehab): Good    Recommended Labs or Other Treatments After Discharge: ***    Physician Certification: I certify the above information and transfer of Jose A Hoover  is necessary for the continuing treatment of the diagnosis listed and that she requires Skilled Nursing Facility for greater 30 days.     Update Admission H&P: No change in H&P    PHYSICIAN SIGNATURE:  Electronically signed by Amadou Hawkins MD on 4/2/25 at 2:16 PM EDT

## 2025-04-02 NOTE — PLAN OF CARE
Problem: Chronic Conditions and Co-morbidities  Goal: Patient's chronic conditions and co-morbidity symptoms are monitored and maintained or improved  4/2/2025 0036 by Gerardo Kaur RN  Outcome: Progressing  4/1/2025 1511 by Oneida Garcia RN  Outcome: Not Progressing     Problem: Pain  Goal: Verbalizes/displays adequate comfort level or baseline comfort level  4/2/2025 0036 by Gerardo Kaur RN  Outcome: Progressing  4/1/2025 1511 by Oneida Garcia RN  Outcome: Progressing     Problem: Skin/Tissue Integrity  Goal: Skin integrity remains intact  Description: 1.  Monitor for areas of redness and/or skin breakdown  2.  Assess vascular access sites hourly  3.  Every 4-6 hours minimum:  Change oxygen saturation probe site  4.  Every 4-6 hours:  If on nasal continuous positive airway pressure, respiratory therapy assess nares and determine need for appliance change or resting period  4/2/2025 0036 by Gerardo Kaur RN  Outcome: Progressing  4/1/2025 1511 by Oneida Garcia RN  Outcome: Progressing     Problem: ABCDS Injury Assessment  Goal: Absence of physical injury  4/2/2025 0036 by Gerardo Kaur RN  Outcome: Progressing  4/1/2025 1511 by Oneida Garcia RN  Outcome: Progressing     Problem: Safety - Adult  Goal: Free from fall injury  4/2/2025 0036 by Gerardo Kaur RN  Outcome: Progressing  4/1/2025 1511 by Oneida Garcia RN  Outcome: Progressing     Problem: Nutrition Deficit:  Goal: Optimize nutritional status  4/2/2025 0036 by Gerardo Kaur RN  Outcome: Progressing  4/1/2025 1511 by Oneida Garcia RN  Outcome: Not Progressing     Problem: Chronic Conditions and Co-morbidities  Goal: Patient's chronic conditions and co-morbidity symptoms are monitored and maintained or improved  4/2/2025 0036 by Gerardo Kaur RN  Outcome: Progressing  4/1/2025 1511 by Oneida Garcia RN  Outcome: Not Progressing     Problem: Nutrition Deficit:  Goal: Optimize nutritional  status  4/2/2025 0036 by Gerardo Kaur, RN  Outcome: Progressing  4/1/2025 1511 by Oneida Garcia, RN  Outcome: Not Progressing

## 2025-04-02 NOTE — PROGRESS NOTES
MD Juan Causey MD  Rufus GuzmanDO                 Office: [phone]                      Fax: [phone]             [employer]                   NEPHROLOGY CONSULT PROGRESS NOTE    Subjective / interval history / medical decision making.  -inad. no new complaints.    IMPRESSION/RECOMMENDATIONS:      #hypernatremia  -suspect 2/2 dehydration  -sNa 156 on presentation, correct sNa ~159  -has likely had poor po intake  -improving on D5W.  -continue increased free water repletion. Monitor Sna goal sNa correction < 10 meq/24 hr.  -encourage PO hydration as able. Monitor off IV fluids.    #DM  -on insulin per primary  -improved    #HTN  Controlled on home lisinopril 2.5qd    #influenza A  -positive on 3/29/25    #encephalopathy  -likely metabolic   -monitor    Thank you for the consult.  We will follow this patient along the hospitalization.  Rufus Guzman DO     High complexity, at risk of impending organ failure needing  higher level of care/monitoring.   Time spent 39 minutes that included face-to-face meeting/discussion with patient, and treatment team (including primary/referring team and other consultants; included coordination of care with the treatment team; and review of patient's electronic medical records and ordering appropriates tests.       Dispo- likely okay for discharge from renal tomorrow once sNa > 135 off IVF.      Reason for Consult:  hypernatremia  Requesting Physician/Provider:  Dr. Amadou Hawkins MD    CHIEF COMPLAINT:   altered mental status    History obtained from records and patient.    HISTORY OF PRESENT ILLNESS:                Jose A Hoover  is 59 y.o. y.o. female with significant past medical history of dementia, DM, HTN who presented with AMS. Patient lives at SNF and staff noticed decline in her mental status and referred to ED. In ED, labs Na 156, blood glucose 380. Nephrology consulted for hypernatremia.     Past Medical History:     has a past medical  history of Abnormal EKG, Depression, Diabetes mellitus (HCC), GERD (gastroesophageal reflux disease), Glossodynia, Hyperlipidemia, and Hypertension.   Past Surgical History:     has a past surgical history that includes Finger surgery and Cholecystectomy.   Current Medications:    Current Facility-Administered Medications: pantoprazole (PROTONIX) injection 40 mg, 40 mg, IntraVENous, Daily  oseltamivir (TAMIFLU) capsule 75 mg, 75 mg, Oral, BID  fluticasone (FLONASE) 50 MCG/ACT nasal spray 1 spray, 1 spray, Each Nostril, Daily PRN  aspirin EC tablet 81 mg, 81 mg, Oral, Daily  lisinopril (PRINIVIL;ZESTRIL) tablet 2.5 mg, 2.5 mg, Oral, Daily  sodium chloride flush 0.9 % injection 5-40 mL, 5-40 mL, IntraVENous, 2 times per day  sodium chloride flush 0.9 % injection 10 mL, 10 mL, IntraVENous, PRN  0.9 % sodium chloride infusion, , IntraVENous, PRN  enoxaparin (LOVENOX) injection 40 mg, 40 mg, SubCUTAneous, Daily  ondansetron (ZOFRAN-ODT) disintegrating tablet 4 mg, 4 mg, Oral, Q8H PRN **OR** ondansetron (ZOFRAN) injection 4 mg, 4 mg, IntraVENous, Q6H PRN  magnesium hydroxide (MILK OF MAGNESIA) 400 MG/5ML suspension 30 mL, 30 mL, Oral, Daily PRN  acetaminophen (TYLENOL) tablet 650 mg, 650 mg, Oral, Q6H PRN **OR** acetaminophen (TYLENOL) suppository 650 mg, 650 mg, Rectal, Q6H PRN  hydrALAZINE (APRESOLINE) injection 10 mg, 10 mg, IntraVENous, Q6H PRN  sodium chloride 0.9 % bolus 500 mL, 500 mL, IntraVENous, PRN  potassium chloride (KLOR-CON M) extended release tablet 40 mEq, 40 mEq, Oral, PRN **OR** potassium bicarb-citric acid (EFFER-K) effervescent tablet 40 mEq, 40 mEq, Oral, PRN **OR** potassium chloride 10 mEq/100 mL IVPB (Peripheral Line), 10 mEq, IntraVENous, PRN  insulin lispro (HUMALOG,ADMELOG) injection vial 0-8 Units, 0-8 Units, SubCUTAneous, 4x Daily AC & HS  dextrose bolus 10% 125 mL, 125 mL, IntraVENous, PRN **OR** dextrose bolus 10% 250 mL, 250 mL, IntraVENous, PRN  glucagon injection 1 mg, 1 mg,

## 2025-04-02 NOTE — PROGRESS NOTES
MD Juan Causey MD  Rufus GuzmanDO                 Office: [phone]                      Fax: [phone]             [employer]                   NEPHROLOGY CONSULT PROGRESS NOTE    Subjective / interval history / medical decision making.  -nonverbal with me. Per icu nurse, eating and drinking more.    IMPRESSION/RECOMMENDATIONS:      #hypernatremia  -suspect 2/2 dehydration  -sNa 156 on presentation, correct sNa ~159  -has likely had poor po intake  -s/p D5W.  -encourage increased PO hydration. Improved.    #DM  -on insulin per primary  -improved    #HTN  Controlled on home lisinopril 2.5qd    #influenza A  -positive on 3/29/25    #encephalopathy  -likely metabolic   -monitor    Thank you for the consult.  We will follow this patient along the hospitalization.  Rufus Guzman DO     High complexity, at risk of impending organ failure needing  higher level of care/monitoring.   Time spent 39 minutes that included face-to-face meeting/discussion with patient, and treatment team (including primary/referring team and other consultants; included coordination of care with the treatment team; and review of patient's electronic medical records and ordering appropriates tests.       Dispo- likely okay for discharge from renal.       Reason for Consult:  hypernatremia  Requesting Physician/Provider:  Dr. Amadou Hawkins MD    CHIEF COMPLAINT:   altered mental status    History obtained from records and patient.    HISTORY OF PRESENT ILLNESS:                Jose A Hoover  is 59 y.o. y.o. female with significant past medical history of dementia, DM, HTN who presented with AMS. Patient lives at SNF and staff noticed decline in her mental status and referred to ED. In ED, labs Na 156, blood glucose 380. Nephrology consulted for hypernatremia.     Past Medical History:     has a past medical history of Abnormal EKG, Depression, Diabetes mellitus (HCC), GERD (gastroesophageal reflux disease),  04/02/2025 05:44 AM    K 3.4 04/02/2025 05:44 AM    K 3.5 04/01/2025 08:55 PM     04/02/2025 05:44 AM    CO2 25 04/02/2025 05:44 AM    BUN 6 04/02/2025 05:44 AM    CREATININE 0.5 04/02/2025 05:44 AM    CALCIUM 8.6 04/02/2025 05:44 AM    GFRAA >60 07/12/2022 10:50 PM    GFRAA >60 04/24/2012 09:30 PM    LABGLOM >90 04/02/2025 05:44 AM    GLUCOSE 205 04/02/2025 05:44 AM   Magnesium:    Lab Results   Component Value Date/Time    MG 1.90 04/02/2025 05:44 AM   Phosphorus:    Lab Results   Component Value Date/Time    PHOS 2.1 04/02/2025 05:44 AM

## 2025-04-08 NOTE — PROGRESS NOTES
Physician Progress Note      PATIENT:               MARILIA VELEZ  CSN #:                  [id number]  :                       1965  ADMIT DATE:       3/29/2025 6:05 AM  DISCH DATE:        2025 6:10 PM  RESPONDING  PROVIDER #:        Amadou LEON MD          QUERY TEXT:    Sepsis is documented in the medical record in the PNs and DCS. Please provide   additional clinical indicators supportive of your documentation. Or please   document if the diagnosis of sepsis has been ruled out after study.    Admitting dx of PNA, FLU and Metabolic Encephalopathy  ED, PNs and DCS state: Sepsis due to pneumonia  WBC: 6.4-7.0-4.5-5.0, Temp : 97.4-99.8, LA 2.8, CXR, CT, CTA  Treated with Tamiflu, Rocephin and Zithromax    The clinical indicators include:  Options provided:  -- Sepsis present as evidenced by, Please document evidence.  -- Sepsis was ruled out after study  -- Other - I will add my own diagnosis  -- Disagree - Not applicable / Not valid  -- Disagree - Clinically unable to determine / Unknown  -- Refer to Clinical Documentation Reviewer    PROVIDER RESPONSE TEXT:    Sepsis was ruled out after study.    Query created by: Delia Guevara on 2025 9:49 AM      QUERY TEXT:    Based on your medical judgment, please clarify these findings and document if   any of the following are being evaluated and/or treated:    Dependence on oxygen at 2 L NC, PNA  ED states: She is oxygenating adequately with 2 L of supplemental oxygen which   is her baseline. Consult note 25: Flu A- wet cough on 2L oxygen.  Labs: ABGs-pH-7.517, Base excess: 6.5 HC03 - 29.8, SPo2 on 2 L NC:     The clinical indicators include:  Options provided:  -- Chronic respiratory failure  -- Other - I will add my own diagnosis  -- Disagree - Not applicable / Not valid  -- Disagree - Clinically unable to determine / Unknown  -- Refer to Clinical Documentation Reviewer    PROVIDER RESPONSE TEXT:    This patient has chronic respiratory